# Patient Record
Sex: MALE | Race: WHITE | Employment: FULL TIME | URBAN - METROPOLITAN AREA
[De-identification: names, ages, dates, MRNs, and addresses within clinical notes are randomized per-mention and may not be internally consistent; named-entity substitution may affect disease eponyms.]

---

## 2017-03-14 ENCOUNTER — TRANSFERRED RECORDS (OUTPATIENT)
Dept: HEALTH INFORMATION MANAGEMENT | Facility: CLINIC | Age: 57
End: 2017-03-14

## 2017-03-21 ENCOUNTER — OFFICE VISIT (OUTPATIENT)
Dept: INTERNAL MEDICINE | Facility: CLINIC | Age: 57
End: 2017-03-21
Payer: COMMERCIAL

## 2017-03-21 ENCOUNTER — RADIANT APPOINTMENT (OUTPATIENT)
Dept: GENERAL RADIOLOGY | Facility: CLINIC | Age: 57
End: 2017-03-21
Attending: NURSE PRACTITIONER
Payer: COMMERCIAL

## 2017-03-21 VITALS
WEIGHT: 205 LBS | SYSTOLIC BLOOD PRESSURE: 99 MMHG | HEART RATE: 60 BPM | TEMPERATURE: 97.5 F | BODY MASS INDEX: 28.7 KG/M2 | OXYGEN SATURATION: 99 % | DIASTOLIC BLOOD PRESSURE: 57 MMHG | HEIGHT: 71 IN

## 2017-03-21 DIAGNOSIS — M95.4 RIB DEFORMITY: Primary | ICD-10-CM

## 2017-03-21 DIAGNOSIS — M95.4 RIB DEFORMITY: ICD-10-CM

## 2017-03-21 PROCEDURE — 71101 X-RAY EXAM UNILAT RIBS/CHEST: CPT | Mod: RT

## 2017-03-21 PROCEDURE — 99213 OFFICE O/P EST LOW 20 MIN: CPT | Performed by: NURSE PRACTITIONER

## 2017-03-21 ASSESSMENT — PAIN SCALES - GENERAL: PAINLEVEL: NO PAIN (0)

## 2017-03-21 NOTE — PROGRESS NOTES
SUBJECTIVE:                                                    Jon Yun is a 56 year old male who presents to clinic today for the following health issues:      Joint Pain     Onset: 1 day    Description:   Location: right side rib, lower rib is sticking out more then the other  Side, no pain.  Character: 0    Intensity: 0    Progression of Symptoms: same    Accompanying Signs & Symptoms:  Other symptoms: none   History:   Previous similar pain: no       Precipitating factors:   Trauma or overuse: no     Alleviating factors:  Improved by: nothing       Therapies Tried and outcome:     Patient concerned that right posterior rib is sticking out  Denies pain  Denies injury or trauma  Recently lost 30 pounds.   Just noticed it yesterday  Denies SOB, cough    Problem list and histories reviewed & adjusted, as indicated.  Additional history: none    Patient Active Problem List   Diagnosis     Gastritis, acute     External hemorrhoids     Cough     Chest pressure     Gastric ulcer     Hiatal hernia     CARDIOVASCULAR SCREENING; LDL GOAL LESS THAN 130     History of depression     Past Surgical History:   Procedure Laterality Date     C OPEN RX ANKLE DISLOCATN       COLONOSCOPY       ENDOSCOPY UPPER, COLONOSCOPY, COMBINED  10/23/2012    Procedure: COMBINED ENDOSCOPY UPPER, COLONOSCOPY;  EGD, COLONOSCOPY, GERD, HISTORY OF CANCER, polypectomy in colon, biopsies of antrum & esophagus;  Surgeon: Corbin Slaughter MD;  Location: [facility] OR     HC RECONSTR NOSE+SOLUEYMANE SEPTAL REPAIR         Social History   Substance Use Topics     Smoking status: Former Smoker     Years: 25.00     Types: Cigarettes     Quit date: 1/1/1998     Smokeless tobacco: Never Used     Alcohol use Yes      Comment: occ     Family History   Problem Relation Age of Onset     Thyroid Disease Mother      CANCER Father 74     Lung cancer     Cardiovascular Maternal Grandmother      Pacemaker age 67     CANCER Maternal Grandfather            Reviewed and  "updated as needed this visit by clinical staff  Tobacco  Allergies  Meds  Med Hx  Surg Hx  Fam Hx  Soc Hx      Reviewed and updated as needed this visit by Provider         ROS:  Noncontributory except as above    OBJECTIVE:                                                    BP 99/57 (BP Location: Right arm, Patient Position: Chair, Cuff Size: Adult Regular)  Pulse 60  Temp 97.5  F (36.4  C) (Oral)  Ht 5' 11\" (1.803 m)  Wt 205 lb (93 kg)  SpO2 99%  BMI 28.59 kg/m2  Body mass index is 28.59 kg/(m^2).  GENERAL: healthy, alert and no distress  RESP: lungs clear to auscultation - no rales, rhonchi or wheezes  CV: regular rate and rhythm, normal S1 S2, no S3 or S4, no murmur, click or rub, no peripheral edema and peripheral pulses strong  MS: Right posterior rib (#9) slightly protruding. Asymmetric with left. Non tender to palpation. No crepitus. No masses.   SKIN: no suspicious lesions or rashes  NEURO: Normal strength and tone, mentation intact and speech normal  ABD: Soft, non tender to palpation, no rebound, no guarding, no hepatomegaly    Diagnostic Test Results:  Xray: no osseus abnormalities     ASSESSMENT/PLAN:                                                        ICD-10-CM    1. Rib deformity M95.4 XR Ribs & Chest Right G/E 3 Views       Xray normal. Could be slight asymetry which he only now noticed after significant weight loss. Nontender. Asymptomatic. Recommend watchful waiting. If changing in size or character, return to clinic     BAYRON Esteves [facility]    "

## 2017-03-21 NOTE — NURSING NOTE
"Chief Complaint   Patient presents with     Musculoskeletal Problem     Rib pain       Initial BP 99/57 (BP Location: Right arm, Patient Position: Chair, Cuff Size: Adult Regular)  Pulse 60  Temp 97.5  F (36.4  C) (Oral)  Ht 5' 11\" (1.803 m)  Wt 205 lb (93 kg)  SpO2 99%  BMI 28.59 kg/m2 Estimated body mass index is 28.59 kg/(m^2) as calculated from the following:    Height as of this encounter: 5' 11\" (1.803 m).    Weight as of this encounter: 205 lb (93 kg).  Medication Reconciliation: complete   RUBEN Rico MA      "

## 2017-03-21 NOTE — MR AVS SNAPSHOT
"              After Visit Summary   3/21/2017    Jon Yun    MRN: [id number]           Patient Information     Date Of Birth          1960        Visit Information        Provider Department      3/21/2017 5:40 PM Stephanie Johnson APRN CNP [facility]        Today's Diagnoses     Rib deformity    -  1       Follow-ups after your visit        Follow-up notes from your care team     Return if symptoms worsen or fail to improve.      Who to contact     If you have questions or need follow up information about today's clinic visit or your schedule please contact [facility] directly at [phone].  Normal or non-critical lab and imaging results will be communicated to you by [employer], letter or phone within 4 business days after the clinic has received the results. If you do not hear from us within 7 days, please contact the clinic through [employer] or phone. If you have a critical or abnormal lab result, we will notify you by phone as soon as possible.  Submit refill requests through [facility] or call your pharmacy and they will forward the refill request to us. Please allow 3 business days for your refill to be completed.          Additional Information About Your Visit        MyChart Information     [facility] gives you secure access to your electronic health record. If you see a primary care provider, you can also send messages to your care team and make appointments. If you have questions, please call your primary care clinic.  If you do not have a primary care provider, please call [phone] and they will assist you.        Care EveryWhere ID     This is your Care EveryWhere ID. This could be used by other organizations to access your [facility] medical records  [id number]        Your Vitals Were     Pulse Temperature Height Pulse Oximetry BMI (Body Mass Index)       60 97.5  F (36.4  C) (Oral) 5' 11\" (1.803 m) 99% 28.59 kg/m2        Blood Pressure from " Last 3 Encounters:   03/21/17 99/57   10/19/16 114/78   10/17/16 130/74    Weight from Last 3 Encounters:   03/21/17 205 lb (93 kg)   10/19/16 272 lb (123.4 kg)   10/17/16 266 lb (120.7 kg)               Primary Care Provider Office Phone # Fax #    Bacilio Mckoy [phone]       [facility] [address]  [facility] [address]        Thank you!     Thank you for choosing [facility]  for your care. Our goal is always to provide you with excellent care. Hearing back from our patients is one way we can continue to improve our services. Please take a few minutes to complete the written survey that you may receive in the mail after your visit with us. Thank you!             Your Updated Medication List - Protect others around you: Learn how to safely use, store and throw away your medicines at www.disposemymeds.org.          This list is accurate as of: 3/21/17 11:59 PM.  Always use your most recent med list.                   Brand Name Dispense Instructions for use    omeprazole 20 MG CR capsule    priLOSEC    90 capsule    Take 1 capsule (20 mg) by mouth daily

## 2017-03-23 NOTE — PROGRESS NOTES
Jon,  I've released the read of your chest xray to [facility].  The radiologist did not see any abnormalities to your ribs.  It could be a subtle asymmetry that you only now notice due to the weight loss. If it worsens, or you continue to be concerned about it, I recommend you follow up with your primary in 1 month.  Stephanie Johnson, CNP

## 2017-05-22 ENCOUNTER — OFFICE VISIT (OUTPATIENT)
Dept: PEDIATRICS | Facility: CLINIC | Age: 57
End: 2017-05-22
Payer: COMMERCIAL

## 2017-05-22 VITALS
HEART RATE: 68 BPM | OXYGEN SATURATION: 98 % | WEIGHT: 201 LBS | BODY MASS INDEX: 28.03 KG/M2 | TEMPERATURE: 98.3 F | DIASTOLIC BLOOD PRESSURE: 59 MMHG | SYSTOLIC BLOOD PRESSURE: 97 MMHG

## 2017-05-22 DIAGNOSIS — R07.89 CHEST PRESSURE: Primary | ICD-10-CM

## 2017-05-22 PROCEDURE — 99396 PREV VISIT EST AGE 40-64: CPT | Performed by: INTERNAL MEDICINE

## 2017-05-22 PROCEDURE — 93000 ELECTROCARDIOGRAM COMPLETE: CPT | Performed by: INTERNAL MEDICINE

## 2017-05-22 ASSESSMENT — PAIN SCALES - GENERAL: PAINLEVEL: NO PAIN (0)

## 2017-05-22 NOTE — MR AVS SNAPSHOT
After Visit Summary   5/22/2017    Jon Yun    MRN: [id number]           Patient Information     Date Of Birth          1960        Visit Information        Provider Department      5/22/2017 12:20 PM Willem Bernal MD [facility]        Today's Diagnoses     Chest pressure    -  1      Care Instructions    Please call [facility] to schedule your stress echo. The number to call is [phone].        Follow-ups after your visit        Future tests that were ordered for you today     Open Future Orders        Priority Expected Expires Ordered    NM Lexiscan stress test Routine  5/22/2018 5/22/2017            Who to contact     If you have questions or need follow up information about today's clinic visit or your schedule please contact [facility] directly at [phone].  Normal or non-critical lab and imaging results will be communicated to you by MyChart, letter or phone within 4 business days after the clinic has received the results. If you do not hear from us within 7 days, please contact the clinic through MyChart or phone. If you have a critical or abnormal lab result, we will notify you by phone as soon as possible.  Submit refill requests through [employer] or call your pharmacy and they will forward the refill request to us. Please allow 3 business days for your refill to be completed.          Additional Information About Your Visit        MyChart Information     [employer] gives you secure access to your electronic health record. If you see a primary care provider, you can also send messages to your care team and make appointments. If you have questions, please call your primary care clinic.  If you do not have a primary care provider, please call [phone] and they will assist you.        Care EveryWhere ID     This is your Care EveryWhere ID. This could be used by other organizations to access your [facility]  records  [id number]        Your Vitals Were     Pulse Temperature Pulse Oximetry BMI (Body Mass Index)          68 98.3  F (36.8  C) (Oral) 98% 28.03 kg/m2         Blood Pressure from Last 3 Encounters:   05/22/17 97/59   03/21/17 99/57   10/19/16 114/78    Weight from Last 3 Encounters:   05/22/17 201 lb (91.2 kg)   03/21/17 205 lb (93 kg)   10/19/16 272 lb (123.4 kg)              We Performed the Following     DEPRESSION ACTION PLAN (DAP)     EKG 12-lead complete w/read - Clinics        Primary Care Provider Office Phone # Fax #    Bacilio Mckoy [phone]       [facility] [address] [facility] [address]        Thank you!     Thank you for choosing [facility]  for your care. Our goal is always to provide you with excellent care. Hearing back from our patients is one way we can continue to improve our services. Please take a few minutes to complete the written survey that you may receive in the mail after your visit with us. Thank you!             Your Updated Medication List - Protect others around you: Learn how to safely use, store and throw away your medicines at www.disposemymeds.org.          This list is accurate as of: 5/22/17  1:19 PM.  Always use your most recent med list.                   Brand Name Dispense Instructions for use    omeprazole 20 MG CR capsule    priLOSEC    90 capsule    Take 1 capsule (20 mg) by mouth daily

## 2017-05-22 NOTE — PROGRESS NOTES
SUBJECTIVE:                                                    Jon Yun is a 57 year old male who presents to clinic today for the following health issues:    Back Pain      Duration: 1-2 weeks        Specific cause: none    Description:   Location of pain: middle of back right  Character of pain: dull ache  Pain radiation: in the chest/ribs  New numbness or weakness in legs, not attributed to pain:  no     Intensity: Currently 0/10    History:   Pain interferes with job: No  History of back problems: no prior back problems  Any previous MRI or X-rays: None  Sees a specialist for back pain:  No  Therapies tried without relief: Nothing    Alleviating factors:   Improved by: Nothing      Precipitating factors:  Worsened by: Lying Flat    Functional and Psychosocial Screen (Christian STarT Back):      Not performed today    Zuleika Carcamo MA     Accompanying Signs & Symptoms:  Risk of Fracture:  None  Risk of Cauda Equina:  None  Risk of Infection:  None  Risk of Cancer:  None  Risk of Ankylosing Spondylitis:  Onset at age <35, male, AND morning back stiffness. no                  Pectus excavatum  Rib and felt and   Pain in the thoracic spine   No chest pain on excerise   No fever.   No cough   Mild shortness of breath   Pressure hard to breath in           Problem list and histories reviewed & adjusted, as indicated.  Additional history: as documented    Patient Active Problem List   Diagnosis     Gastritis, acute     External hemorrhoids     Cough     Chest pressure     Gastric ulcer     Hiatal hernia     CARDIOVASCULAR SCREENING; LDL GOAL LESS THAN 130     History of depression     Past Surgical History:   Procedure Laterality Date     C OPEN RX ANKLE DISLOCATN       COLONOSCOPY       ENDOSCOPY UPPER, COLONOSCOPY, COMBINED  10/23/2012    Procedure: COMBINED ENDOSCOPY UPPER, COLONOSCOPY;  EGD, COLONOSCOPY, GERD, HISTORY OF CANCER, polypectomy in colon, biopsies of antrum & esophagus;  Surgeon: Kasandra  Corbin Parry MD;  Location: [facility] OR     HC RECONSTR NOSE+SOULEYMANE SEPTAL REPAIR         Social History   Substance Use Topics     Smoking status: Former Smoker     Years: 25.00     Types: Cigarettes     Quit date: 1/1/1998     Smokeless tobacco: Never Used     Alcohol use Yes      Comment: occ     Family History   Problem Relation Age of Onset     Thyroid Disease Mother      CANCER Father 74     Lung cancer     Cardiovascular Maternal Grandmother      Pacemaker age 67     CANCER Maternal Grandfather          Current Outpatient Prescriptions   Medication Sig Dispense Refill     omeprazole (PRILOSEC) 20 MG capsule Take 1 capsule (20 mg) by mouth daily 90 capsule 3     No Known Allergies    Reviewed and updated as needed this visit by clinical staff  Tobacco  Allergies  Meds  Med Hx  Surg Hx  Fam Hx  Soc Hx      Reviewed and updated as needed this visit by Provider         ROS:  Constitutional, HEENT, cardiovascular, pulmonary, gi and gu systems are negative, except as otherwise noted.    OBJECTIVE:                                                    BP 97/59 (BP Location: Right arm, Patient Position: Chair, Cuff Size: Adult Regular)  Pulse 68  Temp 98.3  F (36.8  C) (Oral)  Wt 201 lb (91.2 kg)  SpO2 98%  BMI 28.03 kg/m2  Body mass index is 28.03 kg/(m^2).  GENERAL: healthy, alert and no distress  NECK: no adenopathy, no asymmetry, masses, or scars and thyroid normal to palpation  RESP: lungs clear to auscultation - no rales, rhonchi or wheezes  CV: regular rate and rhythm, normal S1 S2, no S3 or S4, no murmur, click or rub, no peripheral edema and peripheral pulses strong  ABDOMEN: soft, nontender, no hepatosplenomegaly, no masses and bowel sounds normal  MS: no gross musculoskeletal defects noted, no edema    Diagnostic Test Results:  Results for orders placed or performed in visit on 03/21/17   XR Ribs & Chest Right G/E 3 Views    Narrative    RIBS AND CHEST RIGHT THREE VIEWS   3/21/2017 6:34 PM     HISTORY:  Deformity of anterior lower ribs.    COMPARISON: Chest x-ray 1/8/2016.    FINDINGS: Heart size is normal. Lungs are clear. There is a  moderate-sized hiatal hernia behind the heart. Rib views are negative.  No discernible interval change.      Impression    IMPRESSION: Hiatal hernia. Nothing acute and no interval change. The  right rib views are negative.    ARMANDO FOWLER MD        ASSESSMENT/PLAN:                                                        ICD-10-CM    1. Chest pressure R07.89 EKG 12-lead complete w/read - Clinics     NM Lexiscan stress test     Maybe due to large hiatal hernia.  However, with age and risk factors, will try to complete stress evaluation    Use ppi regularly until that time           Willem Bernal MD  [facility]

## 2017-05-22 NOTE — NURSING NOTE
"Chief Complaint   Patient presents with     Back Pain     Rib Pain       Initial BP 97/59 (BP Location: Right arm, Patient Position: Chair, Cuff Size: Adult Regular)  Pulse 68  Temp 98.3  F (36.8  C) (Oral)  Wt 201 lb (91.2 kg)  SpO2 98%  BMI 28.03 kg/m2 Estimated body mass index is 28.03 kg/(m^2) as calculated from the following:    Height as of 3/21/17: 5' 11\" (1.803 m).    Weight as of this encounter: 201 lb (91.2 kg).  Medication Reconciliation: complete   Zuleika Carcamo MA      "

## 2019-02-26 ENCOUNTER — OFFICE VISIT (OUTPATIENT)
Dept: PODIATRY | Facility: CLINIC | Age: 59
End: 2019-02-26
Payer: COMMERCIAL

## 2019-02-26 VITALS
WEIGHT: 205 LBS | BODY MASS INDEX: 28.59 KG/M2 | HEART RATE: 90 BPM | SYSTOLIC BLOOD PRESSURE: 108 MMHG | DIASTOLIC BLOOD PRESSURE: 70 MMHG

## 2019-02-26 DIAGNOSIS — B35.1 ONYCHOMYCOSIS: Primary | ICD-10-CM

## 2019-02-26 DIAGNOSIS — L84 CALLUS: ICD-10-CM

## 2019-02-26 PROCEDURE — 99213 OFFICE O/P EST LOW 20 MIN: CPT | Mod: 25 | Performed by: PODIATRIST

## 2019-02-26 PROCEDURE — 11056 PARNG/CUTG B9 HYPRKR LES 2-4: CPT | Performed by: PODIATRIST

## 2019-02-26 RX ORDER — CICLOPIROX 80 MG/ML
1 SOLUTION TOPICAL DAILY
Qty: 6.6 ML | Refills: 3 | Status: SHIPPED | OUTPATIENT
Start: 2019-02-26 | End: 2020-02-26

## 2019-02-26 NOTE — PATIENT INSTRUCTIONS
We wish you continued good healing. If you have any questions or concerns, please do not hesitate to contact us at [phone]    Please remember to call and schedule a follow up appointment if one was recommended at your earliest convenience.   PODIATRY CLINIC HOURS  TELEPHONE NUMBER    Dr. Willem Lozoya D.P.M [facility]    Clinics:  [facility]    [facility]ril [facility]   Tuesday 1PM-6PM  [place]  Wednesday 7AM-2PM  [facility]  Thursday 10AM-6PM  [place]  Friday 7AM-3PM  [place]  Specialty schedulers:   [phone] to make an appointment with any Specialty Provider.        Urgent Care locations:    [facility] Monday-Friday 5 pm - 9 pm. Saturday-Sunday 9 am -5pm    Monday-Friday 11 am - 9 pm Saturday 9 am - 5 pm     Monday-Sunday 12 noon-8PM [phone]     If you need a medication refill, please contact us you may need lab work and/or a follow up visit prior to your refill (i.e. Antifungal medications).    [employer] (secure e-mail communication and access to your chart) to send a message or to make an appointment.    If MRI needed please call [place] at [phone]        Weight management plan: Patient was referred to their PCP to discuss a diet and exercise plan.

## 2019-02-26 NOTE — LETTER
2/26/2019         RE: Jon Yun  [address] [address]        Dear Colleague,    Thank you for referring your patient, Jon Yun, to the [facility]. Please see a copy of my visit note below.    Patient complains of thick left first toenails(s) .  Has had this for several years.  It is slowly getting worse.  Has had pain in the past which is aggravated by activity and relieved by rest.   Does not like the look of the nail and is wondering about options.  Patient also has left sub second thick skin.  Occasionally painful at times.  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  Denies numbness.         ROS:  A 10-point review of systems was performed and is positive for that noted in the HPI and as seen below.  All other areas are negative.        No Known Allergies    Current Outpatient Medications   Medication Sig Dispense Refill     ciclopirox (PENLAC) 8 % external solution Apply 1 applicator topically daily 6.6 mL 3     omeprazole (PRILOSEC) 20 MG capsule Take 1 capsule (20 mg) by mouth daily 90 capsule 3       Patient Active Problem List   Diagnosis     Gastritis, acute     External hemorrhoids     Cough     Chest pressure     Gastric ulcer     Hiatal hernia     CARDIOVASCULAR SCREENING; LDL GOAL LESS THAN 130     History of depression       Past Medical History:   Diagnosis Date     Chest pressure      Cough 8/13/2009     External hemorrhoids 8/13/2009     Gastritis; acute 8/13/2009     GERD (gastroesophageal reflux disease) 8/13/2009     Hiatal hernia 10/2009     Mild major depression (H) 8/13/2009       Past Surgical History:   Procedure Laterality Date     C OPEN RX ANKLE DISLOCATN       COLONOSCOPY       ENDOSCOPY UPPER, COLONOSCOPY, COMBINED  10/23/2012    Procedure: COMBINED ENDOSCOPY UPPER, COLONOSCOPY;  EGD, COLONOSCOPY, GERD, HISTORY OF CANCER, polypectomy in colon, biopsies of antrum & esophagus;  Surgeon: Corbin Slaughter MD;  Location:  OR      RECONSTR  NOSE+SOULEYMANE SEPTAL REPAIR         Family History   Problem Relation Age of Onset     Thyroid Disease Mother      Cancer Father 74        Lung cancer     Cardiovascular Maternal Grandmother         Pacemaker age 67     Cancer Maternal Grandfather        Social History     Tobacco Use     Smoking status: Former Smoker     Years: 25.00     Types: Cigarettes     Last attempt to quit: 1998     Years since quittin.1     Smokeless tobacco: Never Used   Substance Use Topics     Alcohol use: Yes     Comment: occ         /70   Pulse 90   Wt 93 kg (205 lb)   BMI 28.59 kg/m   .      VConstitutional/ general:  Pt is in no apparent distress, appears well-nourished.  Cooperative with history and physical exam. Seen with wife.     Psych:  The patient answered questions appropriately.  Normal affect.  Seems to have reasonable expectations, in terms of treatment.    Eyes:  Visual scanning/ tracking without deficit.    Ears:  Response to auditory stimuli is normal.  negative hearing aid devices.  Auricles in proper alignment.     Lymphatic:  Popliteal lymph nodes not enlarged.     Lungs:  Non labored breathing, non labored speech. No cough.  No audible wheezing. Even, quiet breathing.       Vascular:  Pedal pulses are palpable bilaterally for both the DP and PT arteries.  CFT < 3 sec.  No edema.  Pedal hair growth noted.     Neuro:  Alert and oriented x 3. Coordinated gait.  Light touch sensation is intact to the L4, L5, S1 distributions. No obvious deficits.  No evidence of neurological-based weakness, spasticity, or contracture in the lower extremities.    Derm: Normal texture and turgor.  No erythema, ecchymosis, or cyanosis.  No open lesions. left first toenails(s) lateral thickened, elongated, discolored, with subungual debris.  No erythema, edema, drainage, pain on palpation.  No signs of subungual masses or exostosis.  Calluses x 2 left sub second met head.      Musculoskeletal:    Lower extremity muscle strength  is normal.  Patient is ambulatory without an assistive device or brace.  No gross deformities.  MS 5/5 all compartments.  Normal arch with weightbearing.         A/P  Onychomycosis          Calluses x 2    Discussed all options with patient for onychomycosis.  Risks, complications, and efficacy of topicals were discussed with the patient.  Will start penlac and instructed him on how to use this.  Calluses debrided with a fifteen blade.  Will keep down with pumice stone.  rtc prn.      Willem Lozoya DPM, FACFAS                  Again, thank you for allowing me to participate in the care of your patient.        Sincerely,        Willem Lozoya DPM

## 2019-02-27 NOTE — PROGRESS NOTES
Patient complains of thick left first toenails(s) .  Has had this for several years.  It is slowly getting worse.  Has had pain in the past which is aggravated by activity and relieved by rest.   Does not like the look of the nail and is wondering about options.  Patient also has left sub second thick skin.  Occasionally painful at times.  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  Denies numbness.         ROS:  A 10-point review of systems was performed and is positive for that noted in the HPI and as seen below.  All other areas are negative.        No Known Allergies    Current Outpatient Medications   Medication Sig Dispense Refill     ciclopirox (PENLAC) 8 % external solution Apply 1 applicator topically daily 6.6 mL 3     omeprazole (PRILOSEC) 20 MG capsule Take 1 capsule (20 mg) by mouth daily 90 capsule 3       Patient Active Problem List   Diagnosis     Gastritis, acute     External hemorrhoids     Cough     Chest pressure     Gastric ulcer     Hiatal hernia     CARDIOVASCULAR SCREENING; LDL GOAL LESS THAN 130     History of depression       Past Medical History:   Diagnosis Date     Chest pressure      Cough 8/13/2009     External hemorrhoids 8/13/2009     Gastritis; acute 8/13/2009     GERD (gastroesophageal reflux disease) 8/13/2009     Hiatal hernia 10/2009     Mild major depression (H) 8/13/2009       Past Surgical History:   Procedure Laterality Date     C OPEN RX ANKLE DISLOCATN       COLONOSCOPY       ENDOSCOPY UPPER, COLONOSCOPY, COMBINED  10/23/2012    Procedure: COMBINED ENDOSCOPY UPPER, COLONOSCOPY;  EGD, COLONOSCOPY, GERD, HISTORY OF CANCER, polypectomy in colon, biopsies of antrum & esophagus;  Surgeon: Corbin Slaughter MD;  Location: [facility]     HC RECONSTR NOSE+SOULEYMANE SEPTAL REPAIR         Family History   Problem Relation Age of Onset     Thyroid Disease Mother      Cancer Father 74        Lung cancer     Cardiovascular Maternal Grandmother         Pacemaker age 67     Cancer Maternal Grandfather         Social History     Tobacco Use     Smoking status: Former Smoker     Years: 25.00     Types: Cigarettes     Last attempt to quit: 1998     Years since quittin.1     Smokeless tobacco: Never Used   Substance Use Topics     Alcohol use: Yes     Comment: occ         /70   Pulse 90   Wt 93 kg (205 lb)   BMI 28.59 kg/m  .      VConstitutional/ general:  Pt is in no apparent distress, appears well-nourished.  Cooperative with history and physical exam. Seen with wife.     Psych:  The patient answered questions appropriately.  Normal affect.  Seems to have reasonable expectations, in terms of treatment.    Eyes:  Visual scanning/ tracking without deficit.    Ears:  Response to auditory stimuli is normal.  negative hearing aid devices.  Auricles in proper alignment.     Lymphatic:  Popliteal lymph nodes not enlarged.     Lungs:  Non labored breathing, non labored speech. No cough.  No audible wheezing. Even, quiet breathing.       Vascular:  Pedal pulses are palpable bilaterally for both the DP and PT arteries.  CFT < 3 sec.  No edema.  Pedal hair growth noted.     Neuro:  Alert and oriented x 3. Coordinated gait.  Light touch sensation is intact to the L4, L5, S1 distributions. No obvious deficits.  No evidence of neurological-based weakness, spasticity, or contracture in the lower extremities.    Derm: Normal texture and turgor.  No erythema, ecchymosis, or cyanosis.  No open lesions. left first toenails(s) lateral thickened, elongated, discolored, with subungual debris.  No erythema, edema, drainage, pain on palpation.  No signs of subungual masses or exostosis.  Calluses x 2 left sub second met head.      Musculoskeletal:    Lower extremity muscle strength is normal.  Patient is ambulatory without an assistive device or brace.  No gross deformities.  MS 5/5 all compartments.  Normal arch with weightbearing.         A/P  Onychomycosis          Calluses x 2    Discussed all options with patient for  onychomycosis.  Risks, complications, and efficacy of topicals were discussed with the patient.  Will start penlac and instructed him on how to use this.  Calluses debrided with a fifteen blade.  Will keep down with pumice stone.  rtc prn.      Willem Lozoya DPM, FACGAEL

## 2019-12-08 ENCOUNTER — HEALTH MAINTENANCE LETTER (OUTPATIENT)
Age: 59
End: 2019-12-08

## 2021-01-10 ENCOUNTER — HEALTH MAINTENANCE LETTER (OUTPATIENT)
Age: 61
End: 2021-01-10

## 2021-04-21 ENCOUNTER — OFFICE VISIT (OUTPATIENT)
Dept: URGENT CARE | Facility: URGENT CARE | Age: 61
End: 2021-04-21
Payer: COMMERCIAL

## 2021-04-21 ENCOUNTER — ANCILLARY PROCEDURE (OUTPATIENT)
Dept: GENERAL RADIOLOGY | Facility: CLINIC | Age: 61
End: 2021-04-21
Attending: PHYSICIAN ASSISTANT
Payer: COMMERCIAL

## 2021-04-21 VITALS
WEIGHT: 225 LBS | OXYGEN SATURATION: 97 % | TEMPERATURE: 97.7 F | SYSTOLIC BLOOD PRESSURE: 135 MMHG | RESPIRATION RATE: 20 BRPM | HEART RATE: 67 BPM | BODY MASS INDEX: 31.38 KG/M2 | DIASTOLIC BLOOD PRESSURE: 76 MMHG

## 2021-04-21 DIAGNOSIS — S51.012A ELBOW LACERATION, LEFT, INITIAL ENCOUNTER: ICD-10-CM

## 2021-04-21 DIAGNOSIS — S59.902A ELBOW INJURY, LEFT, INITIAL ENCOUNTER: Primary | ICD-10-CM

## 2021-04-21 PROCEDURE — 73070 X-RAY EXAM OF ELBOW: CPT | Mod: LT | Performed by: RADIOLOGY

## 2021-04-21 PROCEDURE — 99203 OFFICE O/P NEW LOW 30 MIN: CPT | Mod: 25 | Performed by: PHYSICIAN ASSISTANT

## 2021-04-21 PROCEDURE — 12001 RPR S/N/AX/GEN/TRNK 2.5CM/<: CPT | Performed by: PHYSICIAN ASSISTANT

## 2021-04-21 RX ORDER — IBUPROFEN 600 MG/1
600 TABLET, FILM COATED ORAL EVERY 6 HOURS PRN
Qty: 30 TABLET | Refills: 0 | Status: SHIPPED | OUTPATIENT
Start: 2021-04-21 | End: 2022-02-17

## 2021-04-21 ASSESSMENT — ENCOUNTER SYMPTOMS
SHORTNESS OF BREATH: 0
NECK PAIN: 0
MYALGIAS: 1
WHEEZING: 0
FEVER: 0
WOUND: 1
FATIGUE: 0
COUGH: 0
BACK PAIN: 0
COLOR CHANGE: 1
ARTHRALGIAS: 1
CHILLS: 0
NECK STIFFNESS: 0
CHEST TIGHTNESS: 0
JOINT SWELLING: 0
CONSTITUTIONAL NEGATIVE: 1
PALPITATIONS: 0

## 2021-04-21 NOTE — LETTER
[facility] [address] [address]    2021    Re: Jon Yun  [address] [address]  [phone] (home) [phone] (work)    : 1960      To Whom It May Concern:      Jon Judgewilda seen in clinic today and I would recommend no heavy lifting, pushing or pulling greater than 5lbs for the next 1week.  Please feel free to contact me via phone if you have any questions or concerns.        Sincerely,      Shonna See DU James

## 2021-04-21 NOTE — PROGRESS NOTES
Antonio Webb is a 61 year old who presents for the following health issues   HPI   Musculoskeletal problem/pain  Onset/Duration: today  Description  Location: elbow - left  Joint Swelling: YES  Redness: no  Pain: YES  Warmth: no  Intensity:  mild  Progression of Symptoms:  same  Accompanying signs and symptoms:   Fevers: no  Numbness/tingling/weakness: no  History  Trauma to the area: YES- sustained a L elbow injury and laceration after falling down stairs.  No head or neck injuries.  No LOC.  Recent illness:  no  Previous similar problem: no  Previous evaluation:  no  Precipitating or alleviating factors:  Aggravating factors include: pressure, palpation, movement  Therapies tried and outcome: rest/inactivity, immobilization and bandaids with some relief    Patient Active Problem List   Diagnosis     Gastritis, acute     External hemorrhoids     Cough     Chest pressure     Gastric ulcer     Hiatal hernia     CARDIOVASCULAR SCREENING; LDL GOAL LESS THAN 130     History of depression     Current Outpatient Medications   Medication     omeprazole (PRILOSEC) 20 MG capsule     No current facility-administered medications for this visit.       No Known Allergies    Review of Systems   Constitutional: Negative.  Negative for chills, fatigue and fever.   Respiratory: Negative for cough, chest tightness, shortness of breath and wheezing.    Cardiovascular: Negative for chest pain, palpitations and peripheral edema.   Musculoskeletal: Positive for arthralgias and myalgias. Negative for back pain, gait problem, joint swelling, neck pain and neck stiffness.   Skin: Positive for color change and wound. Negative for pallor and rash.   All other systems reviewed and are negative.           Objective    /76 (BP Location: Left arm, Patient Position: Sitting, Cuff Size: Adult Large)   Pulse 67   Temp 97.7  F (36.5  C) (Tympanic)   Resp 20   Wt 102.1 kg (225 lb)   SpO2 97%   BMI 31.38 kg/m    Body mass index is  31.38 kg/m .  Physical Exam  Vitals signs and nursing note reviewed.   Constitutional:       General: He is not in acute distress.     Appearance: Normal appearance. He is well-developed and normal weight. He is not ill-appearing.   Musculoskeletal:      Right elbow: Normal.He exhibits normal range of motion, no swelling, no deformity and no laceration. No tenderness found.      Left elbow: He exhibits decreased range of motion, swelling and laceration (1cm horizontal laceration present ). He exhibits no effusion and no deformity. Tenderness found. No radial head, no medial epicondyle, no lateral epicondyle and no olecranon process tenderness noted.   Skin:     General: Skin is warm and dry.      Capillary Refill: Capillary refill takes less than 2 seconds.      Findings: Abrasion (present over his lower legs bilaterally) present.      Comments: Distal pulses are 2+ and symmetric.  No peripheral edema.   Neurological:      Mental Status: He is alert and oriented to person, place, and time.      Sensory: Sensation is intact. No sensory deficit.      Motor: Motor function is intact.      Deep Tendon Reflexes: Reflexes are normal and symmetric.   Psychiatric:         Mood and Affect: Mood normal.         Behavior: Behavior normal.         Thought Content: Thought content normal.         Judgment: Judgment normal.        Results for orders placed or performed in visit on 04/21/21 (from the past 24 hour(s))   XR Elbow Left 2 Views    Narrative    XR ELBOW LT 2 VW 4/21/2021 3:25 PM     HISTORY: Elbow injury, left, initial encounter      Impression    IMPRESSION: Olecranon spur at the triceps tendon attachment. No  evidence of acute fracture. Joint space width and alignment appear  within normal limits.    REZA BARCENAS MD     Procedure:  Discussed risks and benefits of procedure and patient had decided to proceed. Laceration was irrigated with normal saline and then sterilized with betadine swabs X3.  This was then  anesthetized with 3cc of 2% lidocaine with epi.  Laceration was repaired with two 4-0 ethilon sutures in a simple interrupted fashion and dressed with bacitracin, gauze and coban.  Minimal bleeding.  Patient tolerated procedure well.        Assessment/Plan:  Elbow injury, left, initial encounter:  Xrays were negative for fractures.  No evidence of infection at this time.  He is due for his Td but has declined this at this time due to his recent covid vaccine.  Will get his Td after completion of his covid vaccine.  This was closed with sutures and placed in dressings.  Keep clean and dry for the next 24hours.  Tylenol/ibuprofen as needed for pain.  RTC in 7-10days for suture removal.  RTC sooner if he develops worsening pain, numbness, redness, drainage or fevers.   -     XR Elbow Left 2 Views  -     ibuprofen (ADVIL/MOTRIN) 600 MG tablet; Take 1 tablet (600 mg) by mouth every 6 hours as needed for moderate pain    Elbow laceration, left, initial encounter  -     REPAIR SUPERFICIAL, WOUND BODY < =2.5CM            Shonna See DU James

## 2021-04-27 ENCOUNTER — OFFICE VISIT (OUTPATIENT)
Dept: FAMILY MEDICINE | Facility: CLINIC | Age: 61
End: 2021-04-27
Payer: COMMERCIAL

## 2021-04-27 ENCOUNTER — MYC MEDICAL ADVICE (OUTPATIENT)
Dept: FAMILY MEDICINE | Facility: CLINIC | Age: 61
End: 2021-04-27

## 2021-04-27 ENCOUNTER — ANCILLARY PROCEDURE (OUTPATIENT)
Dept: GENERAL RADIOLOGY | Facility: CLINIC | Age: 61
End: 2021-04-27
Attending: FAMILY MEDICINE
Payer: COMMERCIAL

## 2021-04-27 VITALS
HEIGHT: 71 IN | BODY MASS INDEX: 32.36 KG/M2 | SYSTOLIC BLOOD PRESSURE: 123 MMHG | WEIGHT: 231.13 LBS | DIASTOLIC BLOOD PRESSURE: 56 MMHG | OXYGEN SATURATION: 98 % | TEMPERATURE: 98.2 F | HEART RATE: 60 BPM

## 2021-04-27 DIAGNOSIS — R05.9 COUGH: Primary | ICD-10-CM

## 2021-04-27 DIAGNOSIS — K21.00 GASTROESOPHAGEAL REFLUX DISEASE WITH ESOPHAGITIS, UNSPECIFIED WHETHER HEMORRHAGE: ICD-10-CM

## 2021-04-27 DIAGNOSIS — Z12.2 ENCOUNTER FOR SCREENING FOR LUNG CANCER: Primary | ICD-10-CM

## 2021-04-27 DIAGNOSIS — Z87.891 HISTORY OF TOBACCO ABUSE: ICD-10-CM

## 2021-04-27 DIAGNOSIS — R93.89 ABNORMAL CXR: ICD-10-CM

## 2021-04-27 DIAGNOSIS — J31.0 CHRONIC RHINITIS: ICD-10-CM

## 2021-04-27 PROCEDURE — 71046 X-RAY EXAM CHEST 2 VIEWS: CPT | Performed by: RADIOLOGY

## 2021-04-27 PROCEDURE — 99214 OFFICE O/P EST MOD 30 MIN: CPT | Performed by: FAMILY MEDICINE

## 2021-04-27 RX ORDER — FLUTICASONE PROPIONATE 50 MCG
SPRAY, SUSPENSION (ML) NASAL
Qty: 16 G | Refills: 11 | Status: SHIPPED | OUTPATIENT
Start: 2021-04-27

## 2021-04-27 ASSESSMENT — MIFFLIN-ST. JEOR: SCORE: 1885.51

## 2021-04-27 NOTE — PROGRESS NOTES
"         Antonio Webb is a 61 year old who presents for the following health issues     HPI     Cough for the past couple months    Review of Systems      Heavy smoker in past    Quit about 15 years ago    Smoked for about 20  To 25 years    Not much phlegm; just occasionally     No colored phlegm  No blood    No chest pain    Feels like maybe something  In chest     No fever/ chills    Taste and smell okay    No history of allergies    No itchy or watery eyes    No sneezing or runny nose  Not much sore throat     No acid taste; had in past    Cough is not positional    Worse  Later  In day     Not real short of breath    Lower energy    No orthopnea    Lifting  wts  Recently    Walking a lot at work    Postal delivery walking  Route    Nose often plugged up     Mouth breather            Objective    /56 (BP Location: Right arm, Patient Position: Chair, Cuff Size: Adult Large)   Pulse 60   Temp 98.2  F (36.8  C) (Oral)   Ht 1.803 m (5' 11\")   Wt 104.8 kg (231 lb 2 oz)   SpO2 98%   BMI 32.24 kg/m    Body mass index is 32.24 kg/m .  Physical Exam  Constitutional:       Appearance: He is well-developed.   HENT:      Head: Normocephalic and atraumatic.      Nose: Nose normal.      Mouth/Throat:      Mouth: Mucous membranes are moist.      Pharynx: Oropharynx is clear.   Eyes:      Conjunctiva/sclera: Conjunctivae normal.   Neck:      Vascular: No carotid bruit.   Cardiovascular:      Rate and Rhythm: Normal rate and regular rhythm.      Heart sounds: Normal heart sounds.   Pulmonary:      Effort: Pulmonary effort is normal. No respiratory distress.      Breath sounds: Normal breath sounds.   Chest:      Chest wall: No tenderness.   Abdominal:      Palpations: Abdomen is soft.      Tenderness: There is no abdominal tenderness.   Neurological:      Mental Status: He is alert and oriented to person, place, and time.      Cranial Nerves: No cranial nerve deficit.   Psychiatric:         Speech: Speech " normal.         Behavior: Behavior normal.         tympanic membranes partially seen, wax present  No sinus/ submandib tenderness           ASSESSMENT / PLAN:  (R05) Cough  (primary encounter diagnosis)  Comment: discussed in detail with patient.  cxr done here, abnormal lung base on right next to heart.  No obvious chf.  He does have family history of lung cancer and past history of smoking.  Check CT.  Patient to call and schedule.   Plan: XR Chest 2 Views, CT Chest w/o Contrast             (J31.0) Chronic rhinitis  Comment: trial of flonase  Plan: fluticasone (FLONASE) 50 MCG/ACT nasal spray             (R93.89) Abnormal CXR  Comment: as above   Plan: CT Chest w/o Contrast             (Z87.891) History of tobacco abuse  Comment: discussed  Plan: as above    Gerd:  Advised trial of h2 blocker      I reviewed the patient's medications, allergies, medical history, family history, and social history.    Bacilio Mckoy MD

## 2021-04-27 NOTE — LETTER
[facility]  [address]  [place] [address]  [phone]              2021    To whom it may concern:    Jon Judgewilda cage 64 was seen  Here in clinic today.   Due to current symptoms and medical testing, advise he be excused from overtime for the next two months.              Sincerely,                Bacilio Mckoy MD

## 2021-04-27 NOTE — PATIENT INSTRUCTIONS
Use flonase nasal spray daily for 2-3 weeks as therapeutic trial    Could try an over the counter acid reducer like pepcid ( famotidine )     Call and schedule CT scan

## 2021-04-29 ENCOUNTER — OFFICE VISIT (OUTPATIENT)
Dept: FAMILY MEDICINE | Facility: CLINIC | Age: 61
End: 2021-04-29
Payer: COMMERCIAL

## 2021-04-29 ENCOUNTER — ANCILLARY PROCEDURE (OUTPATIENT)
Dept: GENERAL RADIOLOGY | Facility: CLINIC | Age: 61
End: 2021-04-29
Attending: FAMILY MEDICINE
Payer: COMMERCIAL

## 2021-04-29 VITALS
HEART RATE: 78 BPM | TEMPERATURE: 97.9 F | OXYGEN SATURATION: 97 % | WEIGHT: 229 LBS | BODY MASS INDEX: 31.94 KG/M2 | SYSTOLIC BLOOD PRESSURE: 126 MMHG | DIASTOLIC BLOOD PRESSURE: 67 MMHG

## 2021-04-29 DIAGNOSIS — M25.562 ACUTE PAIN OF LEFT KNEE: Primary | ICD-10-CM

## 2021-04-29 DIAGNOSIS — S51.012D ELBOW LACERATION, LEFT, SUBSEQUENT ENCOUNTER: ICD-10-CM

## 2021-04-29 PROCEDURE — 99214 OFFICE O/P EST MOD 30 MIN: CPT | Performed by: FAMILY MEDICINE

## 2021-04-29 PROCEDURE — 73562 X-RAY EXAM OF KNEE 3: CPT | Mod: LT | Performed by: RADIOLOGY

## 2021-04-29 ASSESSMENT — PAIN SCALES - GENERAL: PAINLEVEL: NO PAIN (0)

## 2021-04-29 NOTE — PROGRESS NOTES
Antonio Webb is a 61 year old who presents for the following health issues:    HPI     Musculoskeletal problem/pain  Onset/Duration: 4/21/2021  Description  Location: knee - left  Joint Swelling: YES- little  Redness: no  Pain: YES- hurts to kneel on it now.   Warmth: no  Intensity:  moderate  Progression of Symptoms:  same  Accompanying signs and symptoms:   Fevers: no  Numbness/tingling/weakness: YES- tingling  History  Trauma to the area: YES- fell downstairs on 4/21/21  Recent illness:  no  Previous similar problem: no  Previous evaluation:  no  Precipitating or alleviating factors:  Aggravating factors include: kneeling on left knee  Therapies tried and outcome: nothing    -look at stiches in left elbow.     Review of Systems   Constitutional, HEENT, cardiovascular, pulmonary, GI, , musculoskeletal, neuro, skin, endocrine and psych systems are negative, except as otherwise noted.      Objective    /67 (BP Location: Left arm, Patient Position: Sitting, Cuff Size: Adult Regular)   Pulse 78   Temp 97.9  F (36.6  C) (Tympanic)   Wt 103.9 kg (229 lb)   SpO2 97%   BMI 31.94 kg/m    Body mass index is 31.94 kg/m .  Physical Exam   GENERAL: healthy, alert and no distress  NECK: no adenopathy, no asymmetry, masses, or scars and thyroid normal to palpation  RESP: lungs clear to auscultation - no rales, rhonchi or wheezes  CV: regular rate and rhythm, normal S1 S2, no S3 or S4, no murmur, click or rub, no peripheral edema and peripheral pulses strong  ABDOMEN: soft, nontender, no hepatosplenomegaly, no masses and bowel sounds normal  MS: mild tenderness left prepatellar area, no redness, no swelling  SKIN: left elbow laceration well approximated, no surrounding erythema    A/P:  (M25.562) Acute pain of left knee  (primary encounter diagnosis)  Comment:   Plan: XR Knee Left 3 Views        Likely mild bursitis. May ice and ibuprofen as needed. Will r/o patellar fracture with xray.    (Y43.349W)  Elbow laceration, left, subsequent encounter  Comment:   Plan: two sutures removed today without complications.    Wyatt Vang MD

## 2021-04-29 NOTE — PATIENT INSTRUCTIONS
At [facility], we strive to deliver an exceptional experience to you, every time we see you. If you receive a survey, please complete it as we do value your feedback.  If you have MyChart, you can expect to receive results automatically within 24 hours of their completion.  Your provider will send a note interpreting your results as well.   If you do not have MyChart, you should receive your results in about a week by mail.    Your care team:                            Family Medicine Internal Medicine   MD Jose Roldan MD Shantel Branch-Fleming, MD Srinivasa Vaka, MD Katya Belousova, PAMYLENE Obrien, APRN CNP    Wyatt Vang, MD Pediatrics   Jesus Pastor, PAMYLENE Philip, CNP MD Snow Jules APRN CNP   MD Carmen Kelly MD Deborah Mielke, MD Monet Mendoza, APRN [facility]      Clinic hours: Monday - Thursday 7 am-6 pm; Fridays 7 am-5 pm.   Urgent care: Monday - Friday 10 am- 8 pm; Saturday and Sunday 9 am-5 pm.    Clinic: [phone]       [facility]: Monday - Thursday 8 am - 7 pm; Friday 8 am - 6 pm  [facility]: [phone]     Use [email or web address] for 24/7 diagnosis and treatment of dozens of conditions.

## 2021-08-01 ENCOUNTER — OFFICE VISIT (OUTPATIENT)
Dept: URGENT CARE | Facility: URGENT CARE | Age: 61
End: 2021-08-01
Payer: COMMERCIAL

## 2021-08-01 VITALS
RESPIRATION RATE: 22 BRPM | DIASTOLIC BLOOD PRESSURE: 76 MMHG | BODY MASS INDEX: 32.05 KG/M2 | WEIGHT: 229.8 LBS | HEART RATE: 60 BPM | SYSTOLIC BLOOD PRESSURE: 125 MMHG | TEMPERATURE: 97.9 F | OXYGEN SATURATION: 98 %

## 2021-08-01 DIAGNOSIS — M79.89 LEFT ARM SWELLING: ICD-10-CM

## 2021-08-01 DIAGNOSIS — Z23 NEED FOR DIPHTHERIA-TETANUS-PERTUSSIS (TDAP) VACCINE: ICD-10-CM
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
## 2021-08-01 DIAGNOSIS — S60.562A INSECT BITE OF LEFT HAND WITH INFECTION, INITIAL ENCOUNTER: ICD-10-CM

## 2021-08-01 DIAGNOSIS — L08.9 INSECT BITE OF LEFT HAND WITH INFECTION, INITIAL ENCOUNTER: ICD-10-CM

## 2021-08-01 DIAGNOSIS — T78.40XA ALLERGIC REACTION, INITIAL ENCOUNTER: ICD-10-CM

## 2021-08-01 DIAGNOSIS — S60.562A INSECT BITE OF LEFT HAND, INITIAL ENCOUNTER: Primary | ICD-10-CM
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
## 2021-08-01 PROCEDURE — 99214 OFFICE O/P EST MOD 30 MIN: CPT | Mod: 25 | Performed by: PHYSICIAN ASSISTANT

## 2021-08-01 PROCEDURE — 90715 TDAP VACCINE 7 YRS/> IM: CPT | Performed by: PHYSICIAN ASSISTANT

## 2021-08-01 PROCEDURE — 90471 IMMUNIZATION ADMIN: CPT | Performed by: PHYSICIAN ASSISTANT

## 2021-08-01 RX ORDER — PREDNISONE 20 MG/1
TABLET ORAL
Qty: 10 TABLET | Refills: 0 | Status: SHIPPED | OUTPATIENT
Start: 2021-08-01 | End: 2022-02-17

## 2021-08-01 RX ORDER — EPINEPHRINE 0.3 MG/.3ML
0.3 INJECTION SUBCUTANEOUS ONCE
Qty: 0.3 ML | Refills: 0 | Status: SHIPPED | OUTPATIENT
Start: 2021-08-01 | End: 2021-08-01

## 2021-08-01 RX ORDER — CEPHALEXIN 500 MG/1
500 CAPSULE ORAL 3 TIMES DAILY
Qty: 21 CAPSULE | Refills: 0 | Status: SHIPPED | OUTPATIENT
Start: 2021-08-01 | End: 2021-08-08

## 2021-08-01 NOTE — PROGRESS NOTES
Chief Complaint   Patient presents with     Insect Bites     stung by a yellow wasp on left hand yesterday afternoon, took benadryl this morning abouot 10 a.m.     Imm/Inj     Tetanus shot              Differential Diagnosis:  Rash: Cellulitis  Contact dermatitis  Drug eruption  Insect bite allergic reaction  Insect bite infection  Urticaria            ASSESSMENT:     ICD-10-CM    1. Insect bite of left hand, initial encounter  S60.562A predniSONE (DELTASONE) 20 MG tablet    W57.XXXA cephALEXin (KEFLEX) 500 MG capsule     EPINEPHrine (ANY BX GENERIC EQUIV) 0.3 MG/0.3ML injection 2-pack   2. Left arm swelling  M79.89 predniSONE (DELTASONE) 20 MG tablet     cephALEXin (KEFLEX) 500 MG capsule     EPINEPHrine (ANY BX GENERIC EQUIV) 0.3 MG/0.3ML injection 2-pack   3. Allergic reaction, initial encounter  T78.40XA predniSONE (DELTASONE) 20 MG tablet     EPINEPHrine (ANY BX GENERIC EQUIV) 0.3 MG/0.3ML injection 2-pack   4. Insect bite of left hand with infection, initial encounter  S60.562A cephALEXin (KEFLEX) 500 MG capsule    L08.9     W57.XXXA    5. Need for diphtheria-tetanus-pertussis (Tdap) vaccine  Z23 TDAP VACCINE (Adacel, Boostrix)  [[id number]]         PLAN: Localized swelling reaction to left hand and forearm secondary to wasp sting.  In addition, exam shows some redness and warmth to palpation which could signal early infection.  Continue Benadryl.  Will add prednisone.  Also Keflex for infection.  Continue ice and elevation.  Off work tomorrow.  Is a .  Given prescription for EpiPen in case subsequent wasp sting in the future produces a worse reaction.  Tetanus updated here today.    I have discussed clinical findings with patient.  Side effects of medications discussed.  Symptomatic care is discussed.  I have discussed the possibility of  worsening symptoms and indication to RTC or go to the ER if they occur.  All questions are answered, patient indicates understanding of these issues and is in  agreement with plan.   Patient care instructions are discussed/given at the end of visit.   Lots of rest and fluids.      Chely Clarke PA-C      SUBJECTIVE:  61-year-old male presents with his wife for evaluation of left hand wasp sting yesterday.  Entire hand and fingers are swollen.  Swelling even went up almost to the elbow.  Has been icing and elevating.  No prior reaction to insect bites.  No fever.  No tongue, lip, throat swelling.  No difficulty breathing.  Also here for tetanus update.  Last tetanus shot was .      No Known Allergies    Past Medical History:   Diagnosis Date     Chest pressure      Cough 2009     External hemorrhoids 2009     Gastritis; acute 2009     GERD (gastroesophageal reflux disease) 2009     Hiatal hernia 10/2009     Mild major depression (H) 2009       fluticasone (FLONASE) 50 MCG/ACT nasal spray, 1-2 sprays each nostril daily (Patient not taking: Reported on 2021)  ibuprofen (ADVIL/MOTRIN) 600 MG tablet, Take 1 tablet (600 mg) by mouth every 6 hours as needed for moderate pain (Patient not taking: Reported on 2021)    No current facility-administered medications on file prior to visit.      Social History     Tobacco Use     Smoking status: Former Smoker     Years: 25.00     Types: Cigarettes     Quit date: 1998     Years since quittin.5     Smokeless tobacco: Never Used   Substance Use Topics     Alcohol use: Yes     Comment: occ       ROS:  CONSTITUTIONAL: Negative for fatigue or fever.  EYES: Negative for eye problems.  ENT: Negative for sore throat   RESP: Negative for shortness of breath   CV: Negative for chest pains.  GI: Negative for vomiting.  MUSCULOSKELETAL: As above   NEUROLOGIC: Negative for headaches.  SKIN: As above   PSYCH: Normal mentation for age.    OBJECTIVE:  /76 (BP Location: Right arm, Patient Position: Sitting, Cuff Size: Adult Regular)   Pulse 60   Temp 97.9  F (36.6  C) (Oral)   Resp 22   Wt  104.2 kg (229 lb 12.8 oz)   SpO2 98%   BMI 32.05 kg/m    GENERAL APPEARANCE: Healthy, alert and no distress.  EYES:Conjunctiva/sclera clear.  EARS: No cerumen.   Ear canals w/o erythema.  TM's intact w/o erythema.    NOSE/MOUTH: Nose without ulcers, erythema or lesions.  SINUSES: No maxillary sinus tenderness.  THROAT: No erythema w/o tonsillar enlargement . No exudates.  NECK: Supple, nontender, no lymphadenopathy.  RESP: Lungs clear to auscultation - no rales, rhonchi or wheezes  CV: Regular rate and rhythm, normal S1 S2, no murmur noted.  NEURO: Awake, alert    SKIN: Right volar thumb area is with 1 mm raised punctate lesion consistent with insect bite.  Swelling of entire left hand all the way up to the mid forearm.  Feels mildly warm to palpation compared to the right.  Some areas of redness such as the knuckles.        Chely Clarke PA-C

## 2021-08-01 NOTE — LETTER
[facility] [address] [address]  Phone: [phone]    August 1, 2021        Jon Yun  [address] [place] [id number]          To whom it may concern:    RE: Jon Yun    Patient was seen and treated today at our clinic. Please excuse from work 8/2/2021. May return 8/2/2021 without restrictions.    Please contact me for questions or concerns.      Sincerely,        Chely Clarke PA-C

## 2021-10-23 ENCOUNTER — HEALTH MAINTENANCE LETTER (OUTPATIENT)
Age: 61
End: 2021-10-23

## 2021-12-30 ENCOUNTER — VIRTUAL VISIT (OUTPATIENT)
Dept: FAMILY MEDICINE | Facility: CLINIC | Age: 61
End: 2021-12-30
Payer: COMMERCIAL

## 2021-12-30 DIAGNOSIS — M54.2 NECK PAIN: Primary | ICD-10-CM

## 2021-12-30 PROCEDURE — 99212 OFFICE O/P EST SF 10 MIN: CPT | Mod: TEL | Performed by: FAMILY MEDICINE

## 2021-12-30 NOTE — PROGRESS NOTES
"Jon is a 61 year old who is being evaluated via a billable telephone visit.      What phone number would you like to be contacted at? [phone]   How would you like to obtain your AVS? MyChart    Assessment & Plan     Neck pain  Recent fall with neck injury and pain, likely musculoskeletal in nature, management discussed with patient, continue with activity modification, ice, over-the-counter work note was written today, patient instructed to follow-up with PCP if not improving or if he develops new symptoms.  Analgesic,      Review of external notes as documented elsewhere in note  12 minutes spent on the date of the encounter doing chart review, review of outside records, review of test results, interpretation of tests, patient visit and documentation        BMI:   Estimated body mass index is 32.05 kg/m  as calculated from the following:    Height as of 4/27/21: 1.803 m (5' 11\").    Weight as of 8/1/21: 104.2 kg (229 lb 12.8 oz).           No follow-ups on file.    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  Current work seems to be exacerbated his neck pain especially when he does overtime work.        Review of Systems   Constitutional, HEENT, cardiovascular, pulmonary, gi and gu systems are negative, except as otherwise noted.      Objective         Vitals:  No vitals were obtained today due to virtual visit.    Physical Exam   healthy, alert and no distress  PSYCH: Alert and oriented times 3; coherent speech, normal   rate and volume, able to articulate logical thoughts, able "   to abstract reason, no tangential thoughts, no hallucinations   or delusions  His affect is normal  RESP: No cough, no audible wheezing, able to talk in full sentences  Remainder of exam unable to be completed due to telephone visits      Phone call duration: 12 minutes

## 2021-12-30 NOTE — LETTER
[facility] [address] [place] [address]  Phone: [phone]  Fax: [phone]    December 30, 2021        Jon Yun  [address] [place] [id number]          To whom it may concern:    RE: Jon Yun    Patient was seen and treated today at our clinic and missed work 12/30 and 12/31.    Please contact me for questions or concerns.      Sincerely,        Jaime Marion MD

## 2022-01-29 ENCOUNTER — NURSE TRIAGE (OUTPATIENT)
Dept: NURSING | Facility: CLINIC | Age: 62
End: 2022-01-29
Payer: COMMERCIAL

## 2022-01-29 NOTE — TELEPHONE ENCOUNTER
Triage call  Wife calling to report that they went and had a rapid covid test and they found they were positive.  Patient gave verbal permission to talk with wife about his health care.  Patient has fever nausea and fatigue. She had questions about isolation and how to treat it.  He did not want a virtual vist but will call back if he feels worse.    Per protocol home care,Care advice given.  Verbalizes understanding and agrees with plan.    Helena De León RN   [facility] Nurse Advisor  3:58 PM 1/29/2022        Reason for Disposition    COVID-19 Testing, questions about    Additional Information    Negative: SEVERE difficulty breathing (e.g., struggling for each breath, speaks in single words)    Negative: Difficult to awaken or acting confused (e.g., disoriented, slurred speech)    Negative: Bluish (or gray) lips or face now    Negative: Shock suspected (e.g., cold/pale/clammy skin, too weak to stand, low BP, rapid pulse)    Negative: Sounds like a life-threatening emergency to the triager    Negative: [1] COVID-19 exposure AND [2] no symptoms    Negative: COVID-19 vaccine reaction suspected (e.g., fever, headache, muscle aches) occurring 1 to 3 days after getting vaccine    Negative: COVID-19 vaccine, questions about    Negative: [1] Lives with someone known to have influenza (flu test positive) AND [2] flu-like symptoms (e.g., cough, runny nose, sore throat, SOB; with or without fever)    Negative: [1] Adult with possible COVID-19 symptoms AND [2] triager concerned about severity of symptoms or other causes    Negative: COVID-19 and breastfeeding, questions about    Negative: SEVERE or constant chest pain or pressure (Exception: mild central chest pain, present only when coughing)    Negative: MODERATE difficulty breathing (e.g., speaks in phrases, SOB even at rest, pulse 100-120)    Negative: [1] Headache AND [2] stiff neck (can't touch chin to chest)    Negative: MILD difficulty breathing (e.g.,  minimal/no SOB at rest, SOB with walking, pulse <100)    Negative: Chest pain or pressure    Negative: Patient sounds very sick or weak to the triager    Negative: Fever > 103 F (39.4 C)    Negative: [1] Fever > 101 F (38.3 C) AND [2] age > 60 years    Negative: [1] Fever > 100.0 F (37.8 C) AND [2] bedridden (e.g., nursing home patient, CVA, chronic illness, recovering from surgery)    Negative: HIGH RISK for severe COVID complications (e.g., age > 64 years, obesity with BMI > 25, pregnant, chronic lung disease or other chronic medical condition)  (Exception: Already seen by PCP and no new or worsening symptoms.)    Negative: [1] HIGH RISK patient AND [2] influenza is widespread in the community AND [3] ONE OR MORE respiratory symptoms: cough, sore throat, runny or stuffy nose    Negative: [1] HIGH RISK patient AND [2] influenza exposure within the last 7 days AND [3] ONE OR MORE respiratory symptoms: cough, sore throat, runny or stuffy nose    Negative: [1] COVID-19 infection suspected by caller or triager AND [2] mild symptoms (cough, fever, or others) AND [3] negative COVID-19 rapid test    Negative: Fever present > 3 days (72 hours)    Negative: [1] Fever returns after gone for over 24 hours AND [2] symptoms worse or not improved    Negative: [1] Continuous (nonstop) coughing interferes with work or school AND [2] no improvement using cough treatment per protocol    Negative: Cough present > 3 weeks    Negative: COVID-19 Home Isolation, questions about    Negative: [1] COVID-19 diagnosed AND [2] has mild nausea, vomiting or diarrhea    Negative: [1] COVID-19 diagnosed by doctor (or NP/PA) AND [2] mild symptoms (e.g., cough, fever, others) AND [3] no complications or SOB    Negative: [1] COVID-19 diagnosed by positive lab test AND [2] mild symptoms (e.g., cough, fever, others) AND [3] no complications or SOB    Negative: [1] COVID-19 diagnosed by positive lab test AND [2] NO symptoms (e.g., cough, fever,  others)    Protocols used: CORONAVIRUS (COVID-19) DIAGNOSED OR [id number] 8.25.2021

## 2022-02-03 ENCOUNTER — NURSE TRIAGE (OUTPATIENT)
Dept: NURSING | Facility: CLINIC | Age: 62
End: 2022-02-03
Payer: COMMERCIAL

## 2022-02-03 NOTE — LETTER
[facility]  [address] [address]  [phone]            February 3, 2022    To whom it may concern:    Jon Court  64 is okay to return to work 2022.            Sincerely,                      Baciloi Mckoy MD

## 2022-02-03 NOTE — LETTER
[facility]  [address] [address]  [phone]            February 4, 2022    To whom it may concern:    Jon Court missed work due to illness.    Advise return to work 2-8-22.                Sincerely,                    Bacilio Mckoy MD

## 2022-02-03 NOTE — TELEPHONE ENCOUNTER
Was DX positive for covid on 1/29/2022.  Symptoms are:    Productive cough    Fever    Sore throat    Fatigue    Received x 2 doses of Moderna vaccine.  Works as a  and walks from 12-14 miles per day.  Requesting a return to work letter.    Maribell Garcia RN, [facility] Triage Nurse Advisor    Reason for Disposition    COVID-19 Home Isolation, questions about    Protocols used: CORONAVIRUS (COVID-19) DIAGNOSED OR [id number] 8.25.2021

## 2022-02-04 NOTE — TELEPHONE ENCOUNTER
Reached out to patient and wife at both numbers on file.    Not available- will need to attempt at a later time.    SUAD MorrisN RN  [facility] [place]

## 2022-02-04 NOTE — TELEPHONE ENCOUNTER
Attempted to notify patient. No voicemail. Please try again later.     Ingris HSU RN  [facility]

## 2022-02-04 NOTE — TELEPHONE ENCOUNTER
Patient is still experiencing symptoms and would like letter to indicate return to work 2/8/22 instead of today.    SUAD MorrisN RN  [facility], [place]

## 2022-02-04 NOTE — TELEPHONE ENCOUNTER
I did letter allowing him to return to work 2-4-2022, but to clarify he needs to have improving symptoms and be fever free for 24 hours (  Without help of tylenol or ibuprofen ).      Please inform patient. He can print out prescription from [facility].    Bacilio Mckoy MD

## 2022-02-12 ENCOUNTER — HEALTH MAINTENANCE LETTER (OUTPATIENT)
Age: 62
End: 2022-02-12

## 2022-02-17 ENCOUNTER — ANCILLARY PROCEDURE (OUTPATIENT)
Dept: GENERAL RADIOLOGY | Facility: CLINIC | Age: 62
End: 2022-02-17
Attending: FAMILY MEDICINE
Payer: COMMERCIAL

## 2022-02-17 ENCOUNTER — OFFICE VISIT (OUTPATIENT)
Dept: FAMILY MEDICINE | Facility: CLINIC | Age: 62
End: 2022-02-17
Payer: COMMERCIAL

## 2022-02-17 VITALS
HEIGHT: 71 IN | BODY MASS INDEX: 32.81 KG/M2 | HEART RATE: 67 BPM | SYSTOLIC BLOOD PRESSURE: 128 MMHG | WEIGHT: 234.38 LBS | DIASTOLIC BLOOD PRESSURE: 68 MMHG | TEMPERATURE: 97 F

## 2022-02-17 DIAGNOSIS — Z86.16 HISTORY OF 2019 NOVEL CORONAVIRUS DISEASE (COVID-19): ICD-10-CM

## 2022-02-17 DIAGNOSIS — M54.50 MIDLINE LOW BACK PAIN WITHOUT SCIATICA, UNSPECIFIED CHRONICITY: Primary | ICD-10-CM

## 2022-02-17 DIAGNOSIS — K59.00 CONSTIPATION, UNSPECIFIED CONSTIPATION TYPE: ICD-10-CM

## 2022-02-17 DIAGNOSIS — Z12.2 SCREENING FOR LUNG CANCER: ICD-10-CM

## 2022-02-17 PROCEDURE — 72100 X-RAY EXAM L-S SPINE 2/3 VWS: CPT | Performed by: RADIOLOGY

## 2022-02-17 PROCEDURE — 99214 OFFICE O/P EST MOD 30 MIN: CPT | Performed by: FAMILY MEDICINE

## 2022-02-17 ASSESSMENT — PAIN SCALES - GENERAL: PAINLEVEL: NO PAIN (0)

## 2022-02-17 NOTE — PROGRESS NOTES
"        Antonio Webb is a 61 year old who presents for the following health issues     History of Present Illness       Back Pain:  He presents for follow up of back pain. Patient's back pain is a recurring problem.  Location of back pain:  Right lower back and left lower back  Description of back pain: dull ache  Back pain spreads: nowhere    Since patient first noticed back pain, pain is: always present, but gets better and worse  Does back pain interfere with his job:  Yes    Reason for visit:  Checking back, lungs  Symptom onset:  More than a month  Symptoms include:  Sore back , occasionally cough. Some occasional leg pain  Symptom intensity:  Moderate  Symptom progression:  Staying the same  Had these symptoms before:  Yes  Has tried/received treatment for these symptoms:  No  What makes it worse:  Too much use    He eats 2-3 servings of fruits and vegetables daily.He consumes 0 sweetened beverage(s) daily.He exercises with enough effort to increase his heart rate 60 or more minutes per day.  He exercises with enough effort to increase his heart rate 5 days per week.   He is taking medications regularly.        Review of Systems   Working  A lot  Has to work overtime    Walk 12 miles daily    On overtime 15 miles daily        Had covid recently   Throat was sore    Dry cough ( had this even  Before covid )     No meds for back      Objective    /68 (BP Location: Right arm, Patient Position: Chair, Cuff Size: Adult Regular)   Pulse 67   Temp 97  F (36.1  C) (Temporal)   Ht 1.803 m (5' 11\")   Wt 106.3 kg (234 lb 6 oz)   BMI 32.69 kg/m    Body mass index is 32.69 kg/m .  Physical Exam  Constitutional:       Appearance: He is well-developed.   HENT:      Head: Normocephalic and atraumatic.   Eyes:      Conjunctiva/sclera: Conjunctivae normal.   Neck:      Vascular: No carotid bruit.   Cardiovascular:      Rate and Rhythm: Normal rate and regular rhythm.      Heart sounds: Normal " heart sounds.   Pulmonary:      Effort: Pulmonary effort is normal. No respiratory distress.      Breath sounds: Normal breath sounds.   Neurological:      Mental Status: He is alert and oriented to person, place, and time.      Cranial Nerves: No cranial nerve deficit.   Psychiatric:         Speech: Speech normal.         Behavior: Behavior normal.      patient points to general low  Back area when asked where pain is; no pont tenderness    No rib tenderness    Sensation and strength are normal in both lower extremities.  Negative straight leg raising test bilaterally.  Able get up on heels and toes normally.  No pain on axial loading.     Range of motion fair         xray lumbar spine done here, normal spine but very large amount of stool present        ASSESSMENT / PLAN:  (M54.50) Midline low back pain without sciatica, unspecified chronicity  (primary encounter diagnosis)  Comment: overall xray and exam reassuring.  Likely chronic soft tissue back pain.  The stool/ constipation may also be contributing  Plan: XR Lumbar Spine 2/3 Views             (Z12.2) Screening for lung cancer  Comment: discussed in detail.  Patient never had this done last time  Plan: CT Chest Lung Cancer Scrn Low Dose wo        Patient to schedule    (K59.00) Constipation, unspecified constipation type  Comment: this may be contributing to back symptoms.   Plan: advised therapeutic trial with miralax to clear this out.  Call us next week with update    (Z86.16) History of 2019 novel coronavirus disease (COVID-19)  Comment: symptoms slowly resolving   Plan: follow up prn       Also see letter done for patient       I reviewed the patient's medications, allergies, medical history, family history, and social history.    Bacilio Mckoy MD

## 2022-02-17 NOTE — PATIENT INSTRUCTIONS
Use over the counter miralax one serving 1-2 x daily for a few days to clean out bowels; this is a therapeutic trial to see if back pain improves    Call Dr. Mckoy next week with update    Schedule CT scan

## 2022-02-17 NOTE — LETTER
[facility]  [address]  [place] [address]  [phone]              2022    To whom it may concern:    Jon Yun  64 is a patient of mine.  Due to medical concerns advise he be excused from overtime at work.  The overtime is causing symptoms for him.            Sincerely,                    Bacilio Mckoy MD

## 2022-02-21 ENCOUNTER — ANCILLARY PROCEDURE (OUTPATIENT)
Dept: CT IMAGING | Facility: CLINIC | Age: 62
End: 2022-02-21
Attending: FAMILY MEDICINE
Payer: COMMERCIAL

## 2022-02-21 DIAGNOSIS — Z12.2 SCREENING FOR LUNG CANCER: ICD-10-CM

## 2022-02-21 PROCEDURE — 71271 CT THORAX LUNG CANCER SCR C-: CPT | Mod: TC | Performed by: RADIOLOGY

## 2022-02-22 ENCOUNTER — MYC MEDICAL ADVICE (OUTPATIENT)
Dept: FAMILY MEDICINE | Facility: CLINIC | Age: 62
End: 2022-02-22

## 2022-02-22 PROBLEM — R91.8 PULMONARY NODULES: Status: ACTIVE | Noted: 2022-02-22

## 2022-02-22 NOTE — LETTER
[facility]  [address]  [place] [address]  [phone]           2022    To whom it may concern     Jon Yun  64 is a patient of ours.  He missed work yesterday and today and will miss tomorrow also for medical reasons.             Sincerely,                 Bacilio Mckoy MD

## 2022-02-22 NOTE — LETTER
[facility]  [address]  [place] [address]  [phone]          2022    To whom it may concern:    Jon Court  64 will miss work today and tomorrow for medical reasons.            Sincerely,                       Bacilio Mckoy MD

## 2022-02-23 ENCOUNTER — TELEPHONE (OUTPATIENT)
Dept: GASTROENTEROLOGY | Facility: CLINIC | Age: 62
End: 2022-02-23
Payer: COMMERCIAL

## 2022-02-23 DIAGNOSIS — Z11.59 ENCOUNTER FOR SCREENING FOR OTHER VIRAL DISEASES: Primary | ICD-10-CM

## 2022-02-23 NOTE — TELEPHONE ENCOUNTER
Screening Questions  Blue=prep questions Red=location Green=sedation   1. Are you active on mychart? Y    2. What insurance is in the chart? [employer]     3.  Ordering/Referring Provider: Bacilio Mckoy MD     4. BMI 32.6, If greater than 40 review exclusion criteria also will need EXTENDED PREP    5.  Respiratory Screening (If yes to any of the following HOSPITAL setting only):     Do you use daily home oxygen? N  Do you have mod to severe Obstructive Sleep Apnea? N (can be seen at [facility] or hospital setting)    Do you have Pulmonary Hypertension? N   Do you have UNCONTROLLED asthma? N    6. Have you had a heart or lung transplant? N  (If yes, please review exclusion criteria)    7. Are you currently on dialysis? N  (If yes, schedule in HOSPITAL setting only)(If yes, please send Golytely prep)    8. Do you have chronic kidney disease? N (If yes, please send Golytely prep)    9. Have you had a stroke or Transient ischemic attack (TIA) within 6 months? N (If yes, do not schedule at [facility])    10. In the past 6 months, have you had any heart related issues including cardiomyopathy or heart attack? N (If yes, please review exclusion criteria)           If yes, did it require cardiac stenting or other implantable device? N  (If yes, please review exclusion criteria)      11. Do you have any implantable devices in your body (pacemaker, defib, LVAD)? N (If yes, schedule at [facility])    12. Do you take nitroglycerin? If yes, how often? N (if yes, schedule at HOSPITAL setting)    13. Are you currently taking any blood thinners? N (If yes- inform patient to follow up with PCP or provider for follow up instructions)     14. Are you a diabetic? N (If yes, please send Golytely prep)    15. (Females) Are you currently pregnant?   If yes, how many weeks?      16. Are you taking any prescription pain medications on a routine schedule? N If yes, MAC sedation and patient will need EXTENDED PREP.    17. Do you have any chemical dependencies such  as alcohol, street drugs, or methadone? N If yes, MAC sedation     18. Do you have any history of post-traumatic stress syndrome, severe anxiety or history of psychosis? N  If yes, MAC sedation.     19. Do you transfer independently? Y    20.  Do you have any issues with constipation? N   If yes, pt will need EXTENDED PREP     21. Preferred Pharmacy for Pre Prescription [id number] - [facility] [address] AT [address]    Scheduling Details    Which Colonoscopy Prep was Sent?: Miralax  Type of Procedure Scheduled: Colon  Surgeon: Leonila  Date of Procedure: 3/15  Location: [place]   Caller (Please ask for phone number if not scheduled by patient): Neisha Yun [phone]      Sedation Type: CS  Conscious Sedation- Needs  for 6 hours after the procedure  MAC/General-Needs  for 24 hours after procedure    Pre-op Required at [facility], [place], [facility] and OR for MAC sedation:   (if yes advise patient they will need a pre-op prior to procedure)      Informed patient they will need an adult  Y  Cannot take any type of public or medical transportation alone    Pre-Procedure Covid test to be completed at [facility] or Externally: Yes, 3/11 Mike    Confirmed Nurse will call to complete assessment Y    Additional comments:  ([facility] PATIENTS NEED EXTENDED PREP)

## 2022-02-24 NOTE — TELEPHONE ENCOUNTER
I called and discussed in detail with patient today 2-24.  See letter.  He has video consult scheduled for lung follow up and also has colonoscopy scheduled.  Try to stay active physically.    Bacilio Mckoy MD

## 2022-03-06 ENCOUNTER — NURSE TRIAGE (OUTPATIENT)
Dept: NURSING | Facility: CLINIC | Age: 62
End: 2022-03-06
Payer: COMMERCIAL

## 2022-03-06 NOTE — TELEPHONE ENCOUNTER
Triage call:     Patient and his wife, Neisha calling to inquire about colonoscopy instructions. He is scheduled 3/15 at [facility].   Advised to follow up with clinic Monday morning to advise.   THey verbalize understanding.     Soha Garibay RN   03/06/22 7:47 AM  [facility] Nurse Advisor    Reason for Disposition    [1] Caller requesting NON-URGENT health information AND [2] PCP's office is the best resource    Additional Information    Negative: Requesting regular office appointment    Negative: RN needs further essential information from caller in order to complete triage    Negative: [1] Caller is not with the adult (patient) AND [2] reporting urgent symptoms    Negative: Lab result questions    Negative: Medication questions    Negative: Caller can't be reached by phone    Negative: Caller has already spoken to PCP or another triager    Protocols used: INFORMATION ONLY CALL - NO TRIAGE-A-

## 2022-03-10 RX ORDER — TADALAFIL 5 MG/1
TABLET ORAL
COMMUNITY
Start: 2022-02-18

## 2022-03-10 RX ORDER — EPINEPHRINE 0.3 MG/.3ML
INJECTION SUBCUTANEOUS
COMMUNITY
Start: 2021-08-01

## 2022-03-11 ENCOUNTER — LAB (OUTPATIENT)
Dept: LAB | Facility: CLINIC | Age: 62
End: 2022-03-11
Payer: COMMERCIAL

## 2022-03-11 DIAGNOSIS — Z11.59 ENCOUNTER FOR SCREENING FOR OTHER VIRAL DISEASES: ICD-10-CM

## 2022-03-11 PROCEDURE — U0005 INFEC AGEN DETEC AMPLI PROBE: HCPCS

## 2022-03-11 PROCEDURE — U0003 INFECTIOUS AGENT DETECTION BY NUCLEIC ACID (DNA OR RNA); SEVERE ACUTE RESPIRATORY SYNDROME CORONAVIRUS 2 (SARS-COV-2) (CORONAVIRUS DISEASE [COVID-19]), AMPLIFIED PROBE TECHNIQUE, MAKING USE OF HIGH THROUGHPUT TECHNOLOGIES AS DESCRIBED BY CMS-2020-01-R: HCPCS

## 2022-03-12 LAB — SARS-COV-2 RNA RESP QL NAA+PROBE: NEGATIVE

## 2022-03-15 ENCOUNTER — HOSPITAL ENCOUNTER (OUTPATIENT)
Facility: AMBULATORY SURGERY CENTER | Age: 62
Discharge: HOME | End: 2022-03-15
Attending: SURGERY | Admitting: SURGERY
Payer: COMMERCIAL

## 2022-03-15 VITALS
SYSTOLIC BLOOD PRESSURE: 108 MMHG | RESPIRATION RATE: 16 BRPM | DIASTOLIC BLOOD PRESSURE: 67 MMHG | OXYGEN SATURATION: 95 % | TEMPERATURE: 97.5 F | HEART RATE: 70 BPM

## 2022-03-15 LAB — COLONOSCOPY: NORMAL

## 2022-03-15 PROCEDURE — 45378 DIAGNOSTIC COLONOSCOPY: CPT

## 2022-03-15 PROCEDURE — G0121 COLON CA SCRN NOT HI RSK IND: HCPCS | Performed by: SURGERY

## 2022-03-15 PROCEDURE — G8918 PT W/O PREOP ORDER IV AB PRO: HCPCS

## 2022-03-15 PROCEDURE — G8907 PT DOC NO EVENTS ON DISCHARG: HCPCS

## 2022-03-15 PROCEDURE — 99152 MOD SED SAME PHYS/QHP 5/>YRS: CPT | Mod: PT | Performed by: SURGERY

## 2022-03-15 RX ORDER — ONDANSETRON 4 MG/1
4 TABLET, ORALLY DISINTEGRATING ORAL EVERY 6 HOURS PRN
Status: DISCONTINUED | OUTPATIENT
Start: 2022-03-15 | End: 2022-03-16 | Stop reason: HOSPADM

## 2022-03-15 RX ORDER — FENTANYL CITRATE 50 UG/ML
INJECTION, SOLUTION INTRAMUSCULAR; INTRAVENOUS PRN
Status: DISCONTINUED | OUTPATIENT
Start: 2022-03-15 | End: 2022-03-15 | Stop reason: HOSPADM

## 2022-03-15 RX ORDER — ONDANSETRON 2 MG/ML
4 INJECTION INTRAMUSCULAR; INTRAVENOUS
Status: DISCONTINUED | OUTPATIENT
Start: 2022-03-15 | End: 2022-03-16 | Stop reason: HOSPADM

## 2022-03-15 RX ORDER — NALOXONE HYDROCHLORIDE 0.4 MG/ML
0.2 INJECTION, SOLUTION INTRAMUSCULAR; INTRAVENOUS; SUBCUTANEOUS
Status: DISCONTINUED | OUTPATIENT
Start: 2022-03-15 | End: 2022-03-16 | Stop reason: HOSPADM

## 2022-03-15 RX ORDER — LIDOCAINE 40 MG/G
CREAM TOPICAL
Status: DISCONTINUED | OUTPATIENT
Start: 2022-03-15 | End: 2022-03-16 | Stop reason: HOSPADM

## 2022-03-15 RX ORDER — PROCHLORPERAZINE MALEATE 10 MG
10 TABLET ORAL EVERY 6 HOURS PRN
Status: DISCONTINUED | OUTPATIENT
Start: 2022-03-15 | End: 2022-03-16 | Stop reason: HOSPADM

## 2022-03-15 RX ORDER — NALOXONE HYDROCHLORIDE 0.4 MG/ML
0.4 INJECTION, SOLUTION INTRAMUSCULAR; INTRAVENOUS; SUBCUTANEOUS
Status: DISCONTINUED | OUTPATIENT
Start: 2022-03-15 | End: 2022-03-16 | Stop reason: HOSPADM

## 2022-03-15 RX ORDER — ONDANSETRON 2 MG/ML
4 INJECTION INTRAMUSCULAR; INTRAVENOUS EVERY 6 HOURS PRN
Status: DISCONTINUED | OUTPATIENT
Start: 2022-03-15 | End: 2022-03-16 | Stop reason: HOSPADM

## 2022-03-15 RX ORDER — FLUMAZENIL 0.1 MG/ML
0.2 INJECTION, SOLUTION INTRAVENOUS
Status: ACTIVE | OUTPATIENT
Start: 2022-03-15 | End: 2022-03-15

## 2022-03-22 ENCOUNTER — VIRTUAL VISIT (OUTPATIENT)
Dept: PULMONOLOGY | Facility: CLINIC | Age: 62
End: 2022-03-22
Attending: FAMILY MEDICINE
Payer: COMMERCIAL

## 2022-03-22 DIAGNOSIS — R91.8 PULMONARY NODULES: ICD-10-CM

## 2022-03-22 PROCEDURE — 99204 OFFICE O/P NEW MOD 45 MIN: CPT | Mod: 95 | Performed by: INTERNAL MEDICINE

## 2022-03-22 NOTE — PROGRESS NOTES
"nola is a 61 year old who is being evaluated via a billable video visit.      How would you like to obtain your AVS? MyChart  If the video visit is dropped, the invitation should be resent by: Text to cell phone: [phone]  Will anyone else be joining your video visit? No    LUNG NODULE & INTERVENTIONAL PULMONARY CLINIC  [facility] [place], [facility], [facility]   VIDEO VISIT    Jon Yun MRN# [id number]   Age: 61 year old YOB: 1960     Reason for Consultation: lung nodule(s)    Requesting Physician: Bacilio Mckoy MD  [address] [place] [address]    The patient has been notified of following:   \"This video visit will be conducted via a call between you and your physician/provider. We have found that certain health care needs can be provided without the need for an in-person physical exam.  This service lets us provide the care you need with a video conversation.  If a prescription is necessary we can send it directly to your pharmacy.  If lab work is needed we can place an order for that and you can then stop by our lab to have the test done at a later time.  Video visits are billed at different rates depending on your insurance coverage.  Please reach out to your insurance provider with any questions.  If during the course of the call the physician/provider feels a video visit is not appropriate, you will not be charged for this service.\"  Patient has given verbal consent for Video visit? Yes  How would you like to obtain your AVS? Please refer to rooming staff note  Patient would like the video invitation sent by: Please refer to rooming staff note   Will anyone else be joining your video visit? Please refer to rooming staff note    Video-Visit Details     Type of service:  Video Visit  Video Start Time: 340  Video End Time: 400  Originating Location (pt. Location): Home  Distant Location (provider location): Home/Clinic  Platform used " for Video Visit: Triston/Maryann Mock MD    Assessment and Plan:    1. New multiple pulmonary lung nodule(s). Given the characteristics on current/previous imaging and risk factors; I would classify this to be Intermediate (6-65%) risk for cancer. Culprit nodule is 10mm in RLL. Plan to follow Fleischner Criteria. Next CT in 3mo.     2. Large hiatal hernia. His sx are minimal. Will hold on thoracic consult for now.     Billing: I spent 45-59min (New, 00406) on the date of the encounter doing chart review, history and exam, documentation and further activities as described in this note.    Shahriar Mock MD   of Medicine  Interventional Pulmonology  Department of Pulmonary, Allergy, Critical Care and Sleep Medicine   [facility] [employer]  Pager: [phone]          History:     Jon Yun is a 61 year old male with sig h/o for GERD, MDD who is here for evaluation/followup of lung nodule(s).    - No new resp sx or complaints. He does c/o dyspnea, abdominal pressure.   - here for evaluation of nodules  - Personal hx of cancer: no   - Family hx of cancer: dad had lung cancer   - Exposure hx: Denies asbestos or radon exposure   - Tobacco hx: Past Smoker: 2ppd since 25yrs. Quit 25yrs ago.   - My interpretation of the images relevant for this visit includes: nodules   - My interpretation of the PFT's relevant for this visit includes: None     Culprit Nodule(s):   1. Multiple bilateral lung nodule(s) that are sub 10 mm. First seen by chest CT on 2/21/22. First 2. observed on this date   3. Largest nodules is RLL 10mm nodules.   4. Large Hiatal hernia    Other active medical problems include:   - has GERD. stable   - has MDD. Stable            Past Medical History:      Past Medical History:   Diagnosis Date     Chest pressure      Cough 8/13/2009     External hemorrhoids 8/13/2009     Gastritis; acute 8/13/2009     GERD (gastroesophageal reflux disease) 8/13/2009     Hiatal  hernia 10/2009     Mild major depression (H) 2009             Past Surgical History:      Past Surgical History:   Procedure Laterality Date     COLONOSCOPY       ENDOSCOPY UPPER, COLONOSCOPY, COMBINED  10/23/2012    Procedure: COMBINED ENDOSCOPY UPPER, COLONOSCOPY;  EGD, COLONOSCOPY, GERD, HISTORY OF CANCER, polypectomy in colon, biopsies of antrum & esophagus;  Surgeon: Corbin Slaughter MD;  Location: [facility] OR     HC RECONSTR NOSE+SOULEYMANE SEPTAL REPAIR       ZZC OPEN RX ANKLE DISLOCATN              Social History:     Social History     Tobacco Use     Smoking status: Former Smoker     Years: 25.00     Types: Cigarettes     Quit date: 1998     Years since quittin.2     Smokeless tobacco: Never Used   Substance Use Topics     Alcohol use: Yes     Comment: occ            Family History:     Family History   Problem Relation Age of Onset     Thyroid Disease Mother      Cancer Father 74        Lung cancer     Cardiovascular Maternal Grandmother         Pacemaker age 67     Cancer Maternal Grandfather              Allergies:      Allergies   Allergen Reactions     Bees Swelling            Medications:     Current Outpatient Medications   Medication Sig     EPINEPHrine (ANY BX GENERIC EQUIV) 0.3 MG/0.3ML injection 2-pack INJECT 0.3 ML IN THE MUSCLE ONCE FOR 1 DOSE AS DIRECTED     fluticasone (FLONASE) 50 MCG/ACT nasal spray 1-2 sprays each nostril daily     tadalafil (CIALIS) 5 MG tablet TAKE ONE TABLET BY MOUTH EVERY DAY, MAY TAKE AN ADDITIONAL 1-3 TABLETS ONE HOUR PRIOR TO SEXUAL ACTIVITY     No current facility-administered medications for this visit.            Review of Systems:     CONSTITUTIONAL: negative for fever, chills, change in weight  INTEGUMENTARY/SKIN: no rash or obvious new lesions  ENT/MOUTH: no sore throat, new sinus pain or nasal drainage  RESP: see interval history  CV: negative for chest pain, palpitations or peripheral edema  GI: no nausea, vomiting, change in stools  : no  dysuria  MUSCULOSKELETAL: no myalgias, arthralgias  ENDOCRINE: blood sugars with adequate control  PSYCHIATRIC: mood stable  LYMPHATIC: no new lymphadenopathy  HEME: no bleeding or easy bruisability  NEURO: no numbness, weakness, headaches         Physical Exam:     Constitutional - looks well, in no apparent distress  Eyes - no redness or discharge  Respiratory -breathing appears comfortable.  No cough.  Skin - No appreciable discoloration or lesions (very limited exam)  Neurological - No apparent tremors. Speech fluent and articlate  Psychiatric - no signs of delirium or anxiety     Exam limited to that easily identified on a virtual visit. The rest of a comprehensive physical examination is deferred due to [facility] (public health emergency) video visit restrictions.         Current Laboratory Data:   All laboratory and imaging data reviewed.           Previous Cardiology Imaging   No results found for this or any previous visit (from the past 8760 hour(s)).

## 2022-03-22 NOTE — LETTER
"3/22/2022       RE: Jon Yun  [address] [address]     Dear Colleague,    Thank you for referring your patient, Jon Yun, [facility]. Please see a copy of my visit note below.    nola is a 61 year old who is being evaluated via a billable video visit.      How would you like to obtain your AVS? MyChart  If the video visit is dropped, the invitation should be resent by: Text to cell phone: [phone]  Will anyone else be joining your video visit? No    LUNG NODULE & INTERVENTIONAL PULMONARY CLINIC  CLINICS & SURGERY [place], [facility]   VIDEO VISIT    Jon Yun MRN# [id number]   Age: 61 year old YOB: 1960     Reason for Consultation: lung nodule(s)    Requesting Physician: Bacilio Mckoy MD  [address] [place] [address]    The patient has been notified of following:   \"This video visit will be conducted via a call between you and your physician/provider. We have found that certain health care needs can be provided without the need for an in-person physical exam.  This service lets us provide the care you need with a video conversation.  If a prescription is necessary we can send it directly to your pharmacy.  If lab work is needed we can place an order for that and you can then stop by our lab to have the test done at a later time.  Video visits are billed at different rates depending on your insurance coverage.  Please reach out to your insurance provider with any questions.  If during the course of the call the physician/provider feels a video visit is not appropriate, you will not be charged for this service.\"  Patient has given verbal consent for Video visit? Yes  How would you like to obtain your AVS? Please refer to rooming staff note  Patient would like the video invitation sent by: Please refer to " rooming staff note   Will anyone else be joining your video visit? Please refer to rooming staff note    Video-Visit Details     Type of service:  Video Visit  Video Start Time: 340  Video End Time: 400  Originating Location (pt. Location): Home  Distant Location (provider location): Home/Clinic  Platform used for Video Visit: [employer]     Shahriar Mock MD    Assessment and Plan:    1. New multiple pulmonary lung nodule(s). Given the characteristics on current/previous imaging and risk factors; I would classify this to be Intermediate (6-65%) risk for cancer. Culprit nodule is 10mm in RLL. Plan to follow Fleischner Criteria. Next CT in 3mo.     2. Large hiatal hernia. His sx are minimal. Will hold on thoracic consult for now.     Billing: I spent 45-59min (New, 61516) on the date of the encounter doing chart review, history and exam, documentation and further activities as described in this note.    Shahriar Mock MD   of Medicine  Interventional Pulmonology  Department of Pulmonary, Allergy, Critical Care and Sleep Medicine   [facility]  Pager: [phone]          History:     Jon Yun is a 61 year old male with sig h/o for GERD, MDD who is here for evaluation/followup of lung nodule(s).    - No new resp sx or complaints. He does c/o dyspnea, abdominal pressure.   - here for evaluation of nodules  - Personal hx of cancer: no   - Family hx of cancer: dad had lung cancer   - Exposure hx: Denies asbestos or radon exposure   - Tobacco hx: Past Smoker: 2ppd since 25yrs. Quit 25yrs ago.   - My interpretation of the images relevant for this visit includes: nodules   - My interpretation of the PFT's relevant for this visit includes: None     Culprit Nodule(s):   1. Multiple bilateral lung nodule(s) that are sub 10 mm. First seen by chest CT on 2/21/22. First 2. observed on this date   3. Largest nodules is RLL 10mm nodules.   4. Large Hiatal hernia    Other active  medical problems include:   - has GERD. stable   - has MDD. Stable            Past Medical History:      Past Medical History:   Diagnosis Date     Chest pressure      Cough 2009     External hemorrhoids 2009     Gastritis; acute 2009     GERD (gastroesophageal reflux disease) 2009     Hiatal hernia 10/2009     Mild major depression (H) 2009             Past Surgical History:      Past Surgical History:   Procedure Laterality Date     COLONOSCOPY       ENDOSCOPY UPPER, COLONOSCOPY, COMBINED  10/23/2012    Procedure: COMBINED ENDOSCOPY UPPER, COLONOSCOPY;  EGD, COLONOSCOPY, GERD, HISTORY OF CANCER, polypectomy in colon, biopsies of antrum & esophagus;  Surgeon: Corbin Slaughter MD;  Location: [facility] OR     HC RECONSTR NOSE+SOULEYMANE SEPTAL REPAIR       ZZC OPEN RX ANKLE DISLOCATN              Social History:     Social History     Tobacco Use     Smoking status: Former Smoker     Years: 25.00     Types: Cigarettes     Quit date: 1998     Years since quittin.2     Smokeless tobacco: Never Used   Substance Use Topics     Alcohol use: Yes     Comment: occ            Family History:     Family History   Problem Relation Age of Onset     Thyroid Disease Mother      Cancer Father 74        Lung cancer     Cardiovascular Maternal Grandmother         Pacemaker age 67     Cancer Maternal Grandfather              Allergies:      Allergies   Allergen Reactions     Bees Swelling            Medications:     Current Outpatient Medications   Medication Sig     EPINEPHrine (ANY BX GENERIC EQUIV) 0.3 MG/0.3ML injection 2-pack INJECT 0.3 ML IN THE MUSCLE ONCE FOR 1 DOSE AS DIRECTED     fluticasone (FLONASE) 50 MCG/ACT nasal spray 1-2 sprays each nostril daily     tadalafil (CIALIS) 5 MG tablet TAKE ONE TABLET BY MOUTH EVERY DAY, MAY TAKE AN ADDITIONAL 1-3 TABLETS ONE HOUR PRIOR TO SEXUAL ACTIVITY     No current facility-administered medications for this visit.            Review of Systems:      CONSTITUTIONAL: negative for fever, chills, change in weight  INTEGUMENTARY/SKIN: no rash or obvious new lesions  ENT/MOUTH: no sore throat, new sinus pain or nasal drainage  RESP: see interval history  CV: negative for chest pain, palpitations or peripheral edema  GI: no nausea, vomiting, change in stools  : no dysuria  MUSCULOSKELETAL: no myalgias, arthralgias  ENDOCRINE: blood sugars with adequate control  PSYCHIATRIC: mood stable  LYMPHATIC: no new lymphadenopathy  HEME: no bleeding or easy bruisability  NEURO: no numbness, weakness, headaches         Physical Exam:     Constitutional - looks well, in no apparent distress  Eyes - no redness or discharge  Respiratory -breathing appears comfortable.  No cough.  Skin - No appreciable discoloration or lesions (very limited exam)  Neurological - No apparent tremors. Speech fluent and articlate  Psychiatric - no signs of delirium or anxiety     Exam limited to that easily identified on a virtual visit. The rest of a comprehensive physical examination is deferred due to PHE (public health emergency) video visit restrictions.         Current Laboratory Data:   All laboratory and imaging data reviewed.           Previous Cardiology Imaging   No results found for this or any previous visit (from the past 8760 hour(s)).                 Again, thank you for allowing me to participate in the care of your patient.      Sincerely,    Shahriar Mock MD

## 2022-04-03 ENCOUNTER — MYC MEDICAL ADVICE (OUTPATIENT)
Dept: PULMONOLOGY | Facility: CLINIC | Age: 62
End: 2022-04-03
Payer: COMMERCIAL

## 2022-04-03 DIAGNOSIS — K44.9 HIATAL HERNIA: Primary | ICD-10-CM

## 2022-04-03 DIAGNOSIS — R05.9 COUGH: ICD-10-CM

## 2022-04-04 RX ORDER — LEVOFLOXACIN 500 MG/1
500 TABLET, FILM COATED ORAL DAILY
Qty: 7 TABLET | Refills: 0 | Status: SHIPPED | OUTPATIENT
Start: 2022-04-04 | End: 2022-04-14

## 2022-04-08 ENCOUNTER — MYC MEDICAL ADVICE (OUTPATIENT)
Dept: PULMONOLOGY | Facility: CLINIC | Age: 62
End: 2022-04-08
Payer: COMMERCIAL

## 2022-04-12 NOTE — PROGRESS NOTES
THORACIC SURGERY - NEW PATIENT OFFICE VISIT      Dear Dr. Mock,    I saw Jon Yun in consultation for the evaluation and treatment of GERD and hiatal hernia.    HPI  Mr. Jon Yun is a 62 year old man who presents for evaluation of hiatal hernia. His symptoms are occasional postprandial pain and occasional burping. No significant heartburn or regurgitation. No dysphagia. Normal BMs. Some intentional weight loss.     Of note, also found to have recent 10mm pulmonary nodule for which he will have repeat CT scan in 3 months, is followed by Dr. Mock. Quite anxious about this and the hiatal hernia.    ECOG performance status  0- Fully active, without restriction                 Previsit Tests   CT Chest 2/12/22- large hiatal hernia.       EGD 10/13/2012 (done for heartburn/dyspepsia)- Severe reflux esophagitis, hiatal hernia with a single Mayur ulcer, biopsy showed mild chronic gastritis with intestinal metaplasia, negative for H. Pylori, no evidence of dysplasia or malignancy.     PMH  Pulmonary nodules- 10mm in RLL, intermediate risk, next CT in 3 months  Mild depression- waxes and wanes    Past Medical History:   Diagnosis Date     Chest pressure      Cough 8/13/2009     External hemorrhoids 8/13/2009     Gastritis; acute 8/13/2009     GERD (gastroesophageal reflux disease) 8/13/2009     Hiatal hernia 10/2009     Mild major depression (H) 8/13/2009        PSH    Past Surgical History:   Procedure Laterality Date     COLONOSCOPY       COLONOSCOPY WITH CO2 INSUFFLATION N/A 3/15/2022    Procedure: COLONOSCOPY, WITH CO2 INSUFFLATION;  Surgeon: Raul Keen, DO;  Location: [facility]     ENDOSCOPY UPPER, COLONOSCOPY, COMBINED  10/23/2012    Procedure: COMBINED ENDOSCOPY UPPER, COLONOSCOPY;  EGD, COLONOSCOPY, GERD, HISTORY OF CANCER, polypectomy in colon, biopsies of antrum & esophagus;  Surgeon: Corbin Slaughter MD;  Location: [facility]     HC RECONSTR NOSE+SOULEYMANE SEPTAL REPAIR       ZZC OPEN RX ANKLE  DISLOCATN          Allergies   Allergen Reactions     Bees Swelling       Current Outpatient Medications   Medication     EPINEPHrine (ANY BX GENERIC EQUIV) 0.3 MG/0.3ML injection 2-pack     fluticasone (FLONASE) 50 MCG/ACT nasal spray     levofloxacin (LEVAQUIN) 500 MG tablet     tadalafil (CIALIS) 5 MG tablet     No current facility-administered medications for this visit.       ETOH: occasional.   TOBACCO: 50PY smoker, quit 25 years ago    Physical examination  /78   Pulse 81   Temp 98.4  F (36.9  C) (Oral)   Wt 94.3 kg (208 lb)   SpO2 99%   BMI 29.01 kg/m    Alert and oriented. NAD  Bilateral breath sounds.   Non-cyanotic.    From a personal perspective, works as a  in [employer] He lives with his wife, he has 2 kids and one foster kid.     IMPRESSION   62 year old male patient with GERD, hiatal hernia and Mayur ulcers.     PLAN  I spent 30 min on the date of the encounter in chart review, patient visit, review of tests, documentation and/or discussion with other providers about the issues documented above. I reviewed the plan as follows:  I discussed with Eric his diagnosis and treatment options. I explained that given that he has evidence of Mayur ulcers we should proceed with surgical repair. I will perform an EGD as part of the work up at the beginning of the case. He would like to discuss it with his family before scheduling it. My office will contact him in a few days.    Procedure planned: EGD, Laparoscopic hiatal hernia repair,  no Nissen, no G-tube.  Necessary Preop Tests & Appointments: EGD, then in-person visit and PAC.    I appreciate the opportunity to participate in the care of your patient and will keep you updated.  Sincerely,  Ranjeet Hussein MD

## 2022-04-13 ENCOUNTER — OFFICE VISIT (OUTPATIENT)
Dept: SURGERY | Facility: CLINIC | Age: 62
End: 2022-04-13
Attending: INTERNAL MEDICINE
Payer: COMMERCIAL

## 2022-04-13 VITALS
WEIGHT: 208 LBS | DIASTOLIC BLOOD PRESSURE: 78 MMHG | HEART RATE: 81 BPM | SYSTOLIC BLOOD PRESSURE: 126 MMHG | TEMPERATURE: 98.4 F | BODY MASS INDEX: 29.01 KG/M2 | OXYGEN SATURATION: 99 %

## 2022-04-13 DIAGNOSIS — K25.7 CHRONIC CAMERON ULCER: Primary | ICD-10-CM

## 2022-04-13 DIAGNOSIS — K44.9 HIATAL HERNIA: ICD-10-CM

## 2022-04-13 PROCEDURE — G0463 HOSPITAL OUTPT CLINIC VISIT: HCPCS

## 2022-04-13 PROCEDURE — 99203 OFFICE O/P NEW LOW 30 MIN: CPT | Performed by: THORACIC SURGERY (CARDIOTHORACIC VASCULAR SURGERY)

## 2022-04-13 ASSESSMENT — PAIN SCALES - GENERAL: PAINLEVEL: MILD PAIN (3)

## 2022-04-13 NOTE — NURSING NOTE
"Oncology Rooming Note    April 13, 2022 9:35 AM   Jon Yun is a 62 year old male who presents for:    Chief Complaint   Patient presents with     Oncology Clinic Visit     Hiatal hernia     Initial Vitals: /78   Pulse 81   Temp 98.4  F (36.9  C) (Oral)   Wt 94.3 kg (208 lb)   SpO2 99%   BMI 29.01 kg/m   Estimated body mass index is 29.01 kg/m  as calculated from the following:    Height as of 2/17/22: 1.803 m (5' 11\").    Weight as of this encounter: 94.3 kg (208 lb). Body surface area is 2.17 meters squared.  Mild Pain (3) Comment: Data Unavailable   No LMP for male patient.  Allergies reviewed: Yes  Medications reviewed: Yes    Medications: Medication refills not needed today.  Pharmacy name entered into [employer]: [id number] - [facility] [address] AT [facility]    Clinical concerns: none       Nichole Forte CMA            "

## 2022-04-13 NOTE — LETTER
4/13/2022     RE: Jon Yun  [address] [address]    Dear Colleague,    Thank you for referring your patient, Jon Yun, to the [facility]. Please see a copy of my visit note below.    THORACIC SURGERY - NEW PATIENT OFFICE VISIT      Dear Dr. Mock,    I saw Jon Yun in consultation for the evaluation and treatment of GERD and hiatal hernia.    HPI  Mr. Jon Yun is a 62 year old man who presents for evaluation of hiatal hernia. His symptoms are occasional postprandial pain and occasional burping. No significant heartburn or regurgitation. No dysphagia. Normal BMs. Some intentional weight loss.     Of note, also found to have recent 10mm pulmonary nodule for which he will have repeat CT scan in 3 months, is followed by Dr. Mock. Quite anxious about this and the hiatal hernia.    ECOG performance status  0- Fully active, without restriction                 Previsit Tests   CT Chest 2/12/22- large hiatal hernia.       EGD 10/13/2012 (done for heartburn/dyspepsia)- Severe reflux esophagitis, hiatal hernia with a single Mayur ulcer, biopsy showed mild chronic gastritis with intestinal metaplasia, negative for H. Pylori, no evidence of dysplasia or malignancy.     PMH  Pulmonary nodules- 10mm in RLL, intermediate risk, next CT in 3 months  Mild depression- waxes and wanes    Past Medical History:   Diagnosis Date     Chest pressure      Cough 8/13/2009     External hemorrhoids 8/13/2009     Gastritis; acute 8/13/2009     GERD (gastroesophageal reflux disease) 8/13/2009     Hiatal hernia 10/2009     Mild major depression (H) 8/13/2009        PSH    Past Surgical History:   Procedure Laterality Date     COLONOSCOPY       COLONOSCOPY WITH CO2 INSUFFLATION N/A 3/15/2022    Procedure: COLONOSCOPY, WITH CO2 INSUFFLATION;  Surgeon: Raul Keen, DO;  Location: [facility] OR     ENDOSCOPY UPPER, COLONOSCOPY, COMBINED  10/23/2012    Procedure:  COMBINED ENDOSCOPY UPPER, COLONOSCOPY;  EGD, COLONOSCOPY, GERD, HISTORY OF CANCER, polypectomy in colon, biopsies of antrum & esophagus;  Surgeon: Corbin Slaughter MD;  Location: [facility] OR     HC RECONSTR NOSE+SOULEYMANE SEPTAL REPAIR       ZZC OPEN RX ANKLE DISLOCATN          Allergies   Allergen Reactions     Bees Swelling       Current Outpatient Medications   Medication     EPINEPHrine (ANY BX GENERIC EQUIV) 0.3 MG/0.3ML injection 2-pack     fluticasone (FLONASE) 50 MCG/ACT nasal spray     levofloxacin (LEVAQUIN) 500 MG tablet     tadalafil (CIALIS) 5 MG tablet     No current facility-administered medications for this visit.       ETOH: occasional.   TOBACCO: 50PY smoker, quit 25 years ago    Physical examination  /78   Pulse 81   Temp 98.4  F (36.9  C) (Oral)   Wt 94.3 kg (208 lb)   SpO2 99%   BMI 29.01 kg/m    Alert and oriented. NAD  Bilateral breath sounds.   Non-cyanotic.    From a personal perspective, works as a  in [employer] He lives with his wife, he has 2 kids and one foster kid.     IMPRESSION   62 year old male patient with GERD, hiatal hernia and Mayur ulcers.     PLAN  I spent 30 min on the date of the encounter in chart review, patient visit, review of tests, documentation and/or discussion with other providers about the issues documented above. I reviewed the plan as follows:  I discussed with Eric his diagnosis and treatment options. I explained that given that he has evidence of Mayur ulcers we should proceed with surgical repair. I will perform an EGD as part of the work up at the beginning of the case. He would like to discuss it with his family before scheduling it. My office will contact him in a few days.    Procedure planned: EGD, Laparoscopic hiatal hernia repair,  no Nissen, no G-tube.  Necessary Preop Tests & Appointments: EGD, then in-person visit and PAC.    I appreciate the opportunity to participate in the care of your patient and will keep you  updated.  Sincerely,  [place]

## 2022-04-14 ENCOUNTER — PATIENT OUTREACH (OUTPATIENT)
Dept: PULMONOLOGY | Facility: CLINIC | Age: 62
End: 2022-04-14
Payer: COMMERCIAL

## 2022-04-14 DIAGNOSIS — R05.9 COUGH: ICD-10-CM

## 2022-04-14 RX ORDER — LEVOFLOXACIN 500 MG/1
500 TABLET, FILM COATED ORAL DAILY
Qty: 7 TABLET | Refills: 0 | Status: SHIPPED | OUTPATIENT
Start: 2022-04-14 | End: 2022-06-28

## 2022-04-14 NOTE — PROGRESS NOTES
Received fax request for refill of levofloxacin.  Patient was on a 7 day course with no refills.    Called the pharmacy and advised them we will not refill at this time.

## 2022-04-14 NOTE — TELEPHONE ENCOUNTER
"Nurse Triage SBAR    Situation: Needs Levaquin refilled. Other questions.    Background:  Sees Dr. Mock    Assessment:   Pt's wife, Neisha, has the following questions:  1) Pt took med in lunchbox at work and the pills got wet. Had not started the course of antibiotic. Tried to get the med refilled at pharmacy, but were instructed to call provider.  Pended up and will be routed to Dr. Mock. Prescription was given to pt because he was having some mucus and coughing up some blood. Dr. Mock thought this sounded like bronchitis and ordered the Levaquin.    2) Pt is very anxious. It affects his sleep. Can't go back to sleep after he wakes up. Often has questions and forgets to ask them. Encouraged Neisha and Eric to write down their questions so they don't forget to ask them. Neisha states that she was with pt on the last zoom call, and he was like a \"deer in the headlights.\"    3) Upcoming apt is 6/28. Neisha is wondering if Eric should be seen sooner since he is so anxious. Has no anti anxiety medication ordered. Will check with Yesenia Pastor to see if she would be willing to call pt and check in with him and see if he has any questions or concerns at this time.    Recommendation:   Routed to Dr. Mock and Yesenia Pastor, RNCC    Yesenia will call pt tomorrow to discuss questions/concerns.    "

## 2022-04-15 NOTE — TELEPHONE ENCOUNTER
Called to discuss patient's questions.  No answer.  LVM for patient to return call to further discuss.

## 2022-04-22 ENCOUNTER — PATIENT OUTREACH (OUTPATIENT)
Dept: SURGERY | Facility: CLINIC | Age: 62
End: 2022-04-22
Payer: COMMERCIAL

## 2022-04-22 NOTE — PROGRESS NOTES
[facility]/Voicemail    Clinical Data: Care Coordinator Outreach    Called patient to follow-up on discussion with Dr Garay about possible surgery. Outreach attempted x 1.  Left message on patient's voicemail with call back information and requested return call.    Plan: Care Coordinator will try to reach patient again in 3-5 business days.    Bianca Salinas RN, BSN  Thoracic Surgery RN Care Coordinator

## 2022-05-11 ENCOUNTER — OFFICE VISIT (OUTPATIENT)
Dept: FAMILY MEDICINE | Facility: CLINIC | Age: 62
End: 2022-05-11
Payer: COMMERCIAL

## 2022-05-11 VITALS
BODY MASS INDEX: 29.01 KG/M2 | HEART RATE: 67 BPM | TEMPERATURE: 97.6 F | WEIGHT: 208 LBS | SYSTOLIC BLOOD PRESSURE: 125 MMHG | DIASTOLIC BLOOD PRESSURE: 81 MMHG

## 2022-05-11 DIAGNOSIS — R91.8 PULMONARY NODULES: ICD-10-CM

## 2022-05-11 DIAGNOSIS — R53.83 FATIGUE, UNSPECIFIED TYPE: ICD-10-CM

## 2022-05-11 DIAGNOSIS — Z12.5 SCREENING FOR PROSTATE CANCER: ICD-10-CM

## 2022-05-11 DIAGNOSIS — K44.9 HIATAL HERNIA: ICD-10-CM

## 2022-05-11 DIAGNOSIS — R10.84 ABDOMINAL PAIN, GENERALIZED: Primary | ICD-10-CM

## 2022-05-11 DIAGNOSIS — K21.9 GASTROESOPHAGEAL REFLUX DISEASE, UNSPECIFIED WHETHER ESOPHAGITIS PRESENT: ICD-10-CM

## 2022-05-11 DIAGNOSIS — R91.8 LUNG NODULES: ICD-10-CM

## 2022-05-11 DIAGNOSIS — R63.4 WEIGHT LOSS: ICD-10-CM

## 2022-05-11 DIAGNOSIS — R35.0 URINARY FREQUENCY: ICD-10-CM

## 2022-05-11 LAB
ALBUMIN SERPL-MCNC: 3.9 G/DL (ref 3.4–5)
ALBUMIN UR-MCNC: NEGATIVE MG/DL
ALP SERPL-CCNC: 58 U/L (ref 40–150)
ALT SERPL W P-5'-P-CCNC: 30 U/L (ref 0–70)
ANION GAP SERPL CALCULATED.3IONS-SCNC: 6 MMOL/L (ref 3–14)
APPEARANCE UR: CLEAR
AST SERPL W P-5'-P-CCNC: 16 U/L (ref 0–45)
BASOPHILS # BLD AUTO: 0.1 10E3/UL (ref 0–0.2)
BASOPHILS NFR BLD AUTO: 1 %
BILIRUB SERPL-MCNC: 0.5 MG/DL (ref 0.2–1.3)
BILIRUB UR QL STRIP: NEGATIVE
BUN SERPL-MCNC: 13 MG/DL (ref 7–30)
CALCIUM SERPL-MCNC: 9.4 MG/DL (ref 8.5–10.1)
CHLORIDE BLD-SCNC: 111 MMOL/L (ref 94–109)
CO2 SERPL-SCNC: 25 MMOL/L (ref 20–32)
COLOR UR AUTO: YELLOW
CREAT SERPL-MCNC: 0.64 MG/DL (ref 0.66–1.25)
EOSINOPHIL # BLD AUTO: 0.5 10E3/UL (ref 0–0.7)
EOSINOPHIL NFR BLD AUTO: 8 %
ERYTHROCYTE [DISTWIDTH] IN BLOOD BY AUTOMATED COUNT: 15.3 % (ref 10–15)
GFR SERPL CREATININE-BSD FRML MDRD: >90 ML/MIN/1.73M2
GLUCOSE BLD-MCNC: 105 MG/DL (ref 70–99)
GLUCOSE UR STRIP-MCNC: NEGATIVE MG/DL
HCT VFR BLD AUTO: 44.1 % (ref 40–53)
HGB BLD-MCNC: 14.7 G/DL (ref 13.3–17.7)
HGB UR QL STRIP: NEGATIVE
KETONES UR STRIP-MCNC: NEGATIVE MG/DL
LEUKOCYTE ESTERASE UR QL STRIP: NEGATIVE
LYMPHOCYTES # BLD AUTO: 1.5 10E3/UL (ref 0.8–5.3)
LYMPHOCYTES NFR BLD AUTO: 25 %
MCH RBC QN AUTO: 30.9 PG (ref 26.5–33)
MCHC RBC AUTO-ENTMCNC: 33.3 G/DL (ref 31.5–36.5)
MCV RBC AUTO: 93 FL (ref 78–100)
MONOCYTES # BLD AUTO: 0.5 10E3/UL (ref 0–1.3)
MONOCYTES NFR BLD AUTO: 8 %
NEUTROPHILS # BLD AUTO: 3.3 10E3/UL (ref 1.6–8.3)
NEUTROPHILS NFR BLD AUTO: 58 %
NITRATE UR QL: NEGATIVE
PH UR STRIP: 7 [PH] (ref 5–7)
PLATELET # BLD AUTO: 212 10E3/UL (ref 150–450)
POTASSIUM BLD-SCNC: 4.2 MMOL/L (ref 3.4–5.3)
PROT SERPL-MCNC: 7.1 G/DL (ref 6.8–8.8)
PSA SERPL-MCNC: 0.91 UG/L (ref 0–4)
RBC # BLD AUTO: 4.76 10E6/UL (ref 4.4–5.9)
RBC #/AREA URNS AUTO: ABNORMAL /HPF
SODIUM SERPL-SCNC: 142 MMOL/L (ref 133–144)
SP GR UR STRIP: <=1.005 (ref 1–1.03)
SQUAMOUS #/AREA URNS AUTO: ABNORMAL /LPF
TSH SERPL DL<=0.005 MIU/L-ACNC: 0.92 MU/L (ref 0.4–4)
UROBILINOGEN UR STRIP-ACNC: 0.2 E.U./DL
WBC # BLD AUTO: 5.8 10E3/UL (ref 4–11)
WBC #/AREA URNS AUTO: ABNORMAL /HPF

## 2022-05-11 PROCEDURE — 99214 OFFICE O/P EST MOD 30 MIN: CPT | Performed by: FAMILY MEDICINE

## 2022-05-11 PROCEDURE — G0103 PSA SCREENING: HCPCS | Performed by: FAMILY MEDICINE

## 2022-05-11 PROCEDURE — 80050 GENERAL HEALTH PANEL: CPT | Performed by: FAMILY MEDICINE

## 2022-05-11 PROCEDURE — 81001 URINALYSIS AUTO W/SCOPE: CPT | Performed by: FAMILY MEDICINE

## 2022-05-11 PROCEDURE — 36415 COLL VENOUS BLD VENIPUNCTURE: CPT | Performed by: FAMILY MEDICINE

## 2022-05-11 ASSESSMENT — PATIENT HEALTH QUESTIONNAIRE - PHQ9
SUM OF ALL RESPONSES TO PHQ QUESTIONS 1-9: 9
10. IF YOU CHECKED OFF ANY PROBLEMS, HOW DIFFICULT HAVE THESE PROBLEMS MADE IT FOR YOU TO DO YOUR WORK, TAKE CARE OF THINGS AT HOME, OR GET ALONG WITH OTHER PEOPLE: SOMEWHAT DIFFICULT
SUM OF ALL RESPONSES TO PHQ QUESTIONS 1-9: 9

## 2022-05-11 ASSESSMENT — ANXIETY QUESTIONNAIRES
3. WORRYING TOO MUCH ABOUT DIFFERENT THINGS: MORE THAN HALF THE DAYS
2. NOT BEING ABLE TO STOP OR CONTROL WORRYING: MORE THAN HALF THE DAYS
5. BEING SO RESTLESS THAT IT IS HARD TO SIT STILL: SEVERAL DAYS
GAD7 TOTAL SCORE: 14
GAD7 TOTAL SCORE: 14
8. IF YOU CHECKED OFF ANY PROBLEMS, HOW DIFFICULT HAVE THESE MADE IT FOR YOU TO DO YOUR WORK, TAKE CARE OF THINGS AT HOME, OR GET ALONG WITH OTHER PEOPLE?: SOMEWHAT DIFFICULT
1. FEELING NERVOUS, ANXIOUS, OR ON EDGE: NEARLY EVERY DAY
7. FEELING AFRAID AS IF SOMETHING AWFUL MIGHT HAPPEN: NEARLY EVERY DAY
4. TROUBLE RELAXING: SEVERAL DAYS
6. BECOMING EASILY ANNOYED OR IRRITABLE: MORE THAN HALF THE DAYS
GAD7 TOTAL SCORE: 14
7. FEELING AFRAID AS IF SOMETHING AWFUL MIGHT HAPPEN: NEARLY EVERY DAY

## 2022-05-11 NOTE — PROGRESS NOTES
Antonio Reyes is a 62 year old who presents for the following health issues     History of Present Illness       Back Pain:  He presents for follow up of back pain. Patient's back pain is a recurring problem.  Location of back pain:  Other  Description of back pain: dull ache and other  Back pain spreads: nowhere    Since patient first noticed back pain, pain is: always present, but gets better and worse  Does back pain interfere with his job:  Yes      Mental Health Follow-up:  Patient presents to follow-up on Depression & Anxiety.Patient's depression since last visit has been:  Medium  The patient is not having other symptoms associated with depression.  Patient's anxiety since last visit has been:  Medium  The patient is not having other symptoms associated with anxiety.  Any significant life events: grief or loss and health concerns  Patient is feeling anxious or having panic attacks.  Patient has no concerns about alcohol or drug use.       Today's PHQ-9         PHQ-9 Total Score: 9  PHQ-9 Q9 Thoughts of better off dead/self-harm past 2 weeks :   (P) Not at all    How difficult have these problems made it for you to do your work, take care of things at home, or get along with other people: Somewhat difficult    Today's MENDY-7 Score: 14    Reason for visit:  Concern about stomach and lungs hiatal hernia swollen glands neck pain anxiety  Symptom onset:  More than a month  Symptoms include:  Various  Symptom intensity:  Moderate  Symptom progression:  Staying the same  Had these symptoms before:  Yes  Has tried/received treatment for these symptoms:  No  What makes it worse:  Not sure  What makes it better:  Not surr    He eats 2-3 servings of fruits and vegetables daily.He consumes 0 sweetened beverage(s) daily.He exercises with enough effort to increase his heart rate 30 to 60 minutes per day.  He exercises with enough effort to increase his heart rate 5 days per week.   He is taking medications  regularly.        Review of Systems   Mid back    Lung nodules more concerning    covid late Ignacio this year  No hospitalization but was quite sick    Currently on levaquin    Intentional wt loss, 25 lb            Objective    /81 (BP Location: Left arm, Patient Position: Chair, Cuff Size: Adult Regular)   Pulse 67   Temp 97.6  F (36.4  C) (Temporal)   Wt 94.3 kg (208 lb)   BMI 29.01 kg/m    Body mass index is 29.01 kg/m .  Physical Exam  Constitutional:       Appearance: He is well-developed.   HENT:      Head: Normocephalic and atraumatic.   Eyes:      Conjunctiva/sclera: Conjunctivae normal.   Neck:      Vascular: No carotid bruit.   Cardiovascular:      Rate and Rhythm: Normal rate and regular rhythm.      Heart sounds: Normal heart sounds.   Pulmonary:      Effort: Pulmonary effort is normal.      Breath sounds: Normal breath sounds.   Abdominal:      Palpations: Abdomen is soft.      Tenderness: There is abdominal tenderness (mild discomfort in epigastric and central abd).   Neurological:      Mental Status: He is alert and oriented to person, place, and time.      Cranial Nerves: No cranial nerve deficit.   Psychiatric:         Speech: Speech normal.         Behavior: Behavior normal.           ASSESSMENT / PLAN:  (R10.84) Abdominal pain, generalized  (primary encounter diagnosis)  Comment: discussed multiple issues with patient.  He had lung imaging recently but prudent to add abd imaging also given pain and wt loss.   Plan: CT Abdomen Pelvis w Contrast             (R63.4) Weight loss  Comment: as above   Plan: CT Abdomen Pelvis w Contrast        Patient to call and schedule    (R91.8) Lung nodules  Comment: patient is to have follow up CT lungs in June . This is per  Guidelines   Plan: as above.     (K44.9) Hiatal hernia  Comment: advise egd and starting ppi.  Patient to call and schedule egd.    Plan: omeprazole (PRILOSEC) 20 MG DR capsule, Adult         Gastro Ref - Procedure Only              (K21.9) Gastroesophageal reflux disease, unspecified whether esophagitis present  Comment: as above   Plan: Adult Gastro Ref - Procedure Only             (R53.83) Fatigue, unspecified type  Comment:  Check labs   Plan: CBC with Platelets & Differential,         Comprehensive metabolic panel, TSH with free T4        reflex             (R35.0) Urinary frequency  Comment: check ua   Plan: UA with Microscopic reflex to Culture - lab         collect, Urine Microscopic             (Z12.5) Screening for prostate cancer  Comment: psa   Plan: Prostate Specific Antigen Screen             (R91.8) Pulmonary nodules  Comment: as above   Plan: as above       I reviewed the patient's medications, allergies, medical history, family history, and social history.    Bacilio Mckoy MD

## 2022-05-11 NOTE — PATIENT INSTRUCTIONS
Schedule upper endoscopy    Start omeprazole    Finish out antibiotics    Schedule CT abdomen/ pelvis    We will send you lab results

## 2022-05-12 ENCOUNTER — TELEPHONE (OUTPATIENT)
Dept: GASTROENTEROLOGY | Facility: CLINIC | Age: 62
End: 2022-05-12
Payer: COMMERCIAL

## 2022-05-12 DIAGNOSIS — Z11.59 ENCOUNTER FOR SCREENING FOR OTHER VIRAL DISEASES: Primary | ICD-10-CM

## 2022-05-12 ASSESSMENT — PATIENT HEALTH QUESTIONNAIRE - PHQ9: SUM OF ALL RESPONSES TO PHQ QUESTIONS 1-9: 9

## 2022-05-12 ASSESSMENT — ANXIETY QUESTIONNAIRES: GAD7 TOTAL SCORE: 14

## 2022-05-12 NOTE — TELEPHONE ENCOUNTER
Screening Questions  BlueKIND OF PREP RedLOCATION [review exclusion criteria] GreenSEDATION TYPE  1. Have you had a positive covid test in the last 90 days? N     2. Do you have a legal guardian or medical Power of ?  Are you able to give consent for your medical care? N  (Sedation review/consideration needed)    3. Are you active on mychart? N    4. What insurance is in the chart? NACL     3.   Ordering/Referring Provider: DB VALENZUELA MD    4. BMI 29.01  [BMI OVER 40-EXTENDED PREP]  If greater than 40 review exclusion criteria [PAC APPT IF @ [facility]]      5.  Respiratory Screening :  [If yes to any of the following HOSPITAL setting only]     Do you use daily home oxygen? N    Do you have mod to severe Obstructive Sleep Apnea? N  [OKAY @ [facility] SH PH RI]   Do you have Pulmonary Hypertension? N     Do you have UNCONTROLLED asthma? N      6.   Have you had a heart or lung transplant? N      7.   Are you currently on dialysis? N [ If yes, G-PREP & HOSPITAL setting only]     8.   Do you have chronic kidney disease? N  [ If yes, G-PREP ]    9.   Have you had a stroke or Transient ischemic attack (TIA - aka  mini stroke ) within 6 months?  N (If yes, please review exclusion criteria)    10.   In the past 6 months, have you had any heart related issues including cardiomyopathy or heart attack? N           If yes, did it require cardiac stenting or other implantable device? N      11.   Do you have any implantable devices in your body (pacemaker, defib, LVAD)? N  (If yes, please review exclusion criteria)    12.   Do you take nitroglycerin? N           If yes, how often? N  (if yes, HOSPITAL setting ONLY)    13.   Are you currently taking any blood thinners? N           [IF YES, INFORM PATIENT TO FOLLOW UP W/ ORDERING PROVIDER FOR BRIDGING INSTRUCTIONS]     14.   Do you have a diagnosis of diabetes? N   [ If yes, G-PREP ]    15.   [FEMALES] Are you currently pregnant? N    If yes, how many weeks? N    16.    Are you taking any prescription pain medications on a routine schedule?  N    [ If yes, EXTENDED PREP.] [If yes, MAC]    17.   Do you have any chemical dependencies such as alcohol, street drugs, or methadone?  N   [If yes, MAC]    18.   Do you have any history of post-traumatic stress syndrome, severe anxiety or history of psychosis?  N   [If yes, MAC]    19.   Do you transfer independently?  Y    20.  On a regular basis do you go 3-5 days between bowel movements?    [ If yes, EXTENDED PREP.]    21.   Preferred LOCAL Pharmacy for Pre Prescription  [facility]    Scheduling Details      Caller : JENY, WIFE  [phone]    (Please ask for phone number if not scheduled by patient)    Type of Procedure Scheduled: EGD  Which Colonoscopy Prep was Sent?:   SUDHIR CF PATIENTS & GROELIS'S PATIENTS NEEDS EXTENDED PREP  Surgeon: MELODY  Date of Procedure: 5/26  Location:     Sedation Type: CS  Conscious Sedation- Needs  for 6 hours after the procedure  MAC/General-Needs  for 24 hours after procedure    Pre-op Required at [facility], [place], [place] and OR for MAC sedation: N  (advise patient they will need a pre-op prior to procedure -)      Informed patient they will need an adult  Y  Cannot take any type of public or medical transportation alone    Pre-Procedure Cov[facility] or Externally: Y @ [place]    Confirmed Nurse will call to complete assessment Y    Additional comments:

## 2022-05-12 NOTE — RESULT ENCOUNTER NOTE
These labs are all fine.    Don't worry about the creatinine being low.  This kidney test is only worrisome if high.    Bacilio Mckoy MD

## 2022-05-13 ENCOUNTER — IMMUNIZATION (OUTPATIENT)
Dept: NURSING | Facility: CLINIC | Age: 62
End: 2022-05-13
Payer: COMMERCIAL

## 2022-05-13 PROCEDURE — 91306 COVID-19,PF,MODERNA (18+ YRS BOOSTER .25ML): CPT

## 2022-05-13 PROCEDURE — 0064A COVID-19,PF,MODERNA (18+ YRS BOOSTER .25ML): CPT

## 2022-05-16 ENCOUNTER — TELEPHONE (OUTPATIENT)
Dept: FAMILY MEDICINE | Facility: CLINIC | Age: 62
End: 2022-05-16
Payer: COMMERCIAL

## 2022-05-16 NOTE — LETTER
[facility]  [address]  [place] [address]  [phone]            May 16, 2022    To whom it may concern:    Jon cage 64 was seen here at the clinic 22.              Sincerely,                  Bacilio Mckoy MD

## 2022-05-23 ENCOUNTER — LAB (OUTPATIENT)
Dept: URGENT CARE | Facility: URGENT CARE | Age: 62
End: 2022-05-23
Attending: INTERNAL MEDICINE
Payer: COMMERCIAL

## 2022-05-23 DIAGNOSIS — Z11.59 ENCOUNTER FOR SCREENING FOR OTHER VIRAL DISEASES: ICD-10-CM

## 2022-05-23 PROCEDURE — U0005 INFEC AGEN DETEC AMPLI PROBE: HCPCS

## 2022-05-23 PROCEDURE — U0003 INFECTIOUS AGENT DETECTION BY NUCLEIC ACID (DNA OR RNA); SEVERE ACUTE RESPIRATORY SYNDROME CORONAVIRUS 2 (SARS-COV-2) (CORONAVIRUS DISEASE [COVID-19]), AMPLIFIED PROBE TECHNIQUE, MAKING USE OF HIGH THROUGHPUT TECHNOLOGIES AS DESCRIBED BY CMS-2020-01-R: HCPCS

## 2022-05-24 LAB — SARS-COV-2 RNA RESP QL NAA+PROBE: NEGATIVE

## 2022-05-26 ENCOUNTER — HOSPITAL ENCOUNTER (OUTPATIENT)
Facility: AMBULATORY SURGERY CENTER | Age: 62
Discharge: HOME | End: 2022-05-26
Attending: INTERNAL MEDICINE | Admitting: INTERNAL MEDICINE
Payer: COMMERCIAL

## 2022-05-26 VITALS
DIASTOLIC BLOOD PRESSURE: 74 MMHG | OXYGEN SATURATION: 94 % | SYSTOLIC BLOOD PRESSURE: 103 MMHG | RESPIRATION RATE: 16 BRPM | HEART RATE: 76 BPM | TEMPERATURE: 97.8 F

## 2022-05-26 LAB — UPPER GI ENDOSCOPY: NORMAL

## 2022-05-26 PROCEDURE — G8907 PT DOC NO EVENTS ON DISCHARG: HCPCS

## 2022-05-26 PROCEDURE — G8918 PT W/O PREOP ORDER IV AB PRO: HCPCS

## 2022-05-26 PROCEDURE — 43235 EGD DIAGNOSTIC BRUSH WASH: CPT

## 2022-05-26 RX ORDER — NALOXONE HYDROCHLORIDE 0.4 MG/ML
0.4 INJECTION, SOLUTION INTRAMUSCULAR; INTRAVENOUS; SUBCUTANEOUS
Status: CANCELLED | OUTPATIENT
Start: 2022-05-26

## 2022-05-26 RX ORDER — ONDANSETRON 2 MG/ML
4 INJECTION INTRAMUSCULAR; INTRAVENOUS
Status: DISCONTINUED | OUTPATIENT
Start: 2022-05-26 | End: 2022-05-27 | Stop reason: HOSPADM

## 2022-05-26 RX ORDER — ONDANSETRON 2 MG/ML
4 INJECTION INTRAMUSCULAR; INTRAVENOUS EVERY 6 HOURS PRN
Status: CANCELLED | OUTPATIENT
Start: 2022-05-26

## 2022-05-26 RX ORDER — FENTANYL CITRATE 50 UG/ML
INJECTION, SOLUTION INTRAMUSCULAR; INTRAVENOUS PRN
Status: DISCONTINUED | OUTPATIENT
Start: 2022-05-26 | End: 2022-05-26 | Stop reason: HOSPADM

## 2022-05-26 RX ORDER — NALOXONE HYDROCHLORIDE 0.4 MG/ML
0.2 INJECTION, SOLUTION INTRAMUSCULAR; INTRAVENOUS; SUBCUTANEOUS
Status: CANCELLED | OUTPATIENT
Start: 2022-05-26

## 2022-05-26 RX ORDER — ONDANSETRON 4 MG/1
4 TABLET, ORALLY DISINTEGRATING ORAL EVERY 6 HOURS PRN
Status: CANCELLED | OUTPATIENT
Start: 2022-05-26

## 2022-05-26 RX ORDER — FLUMAZENIL 0.1 MG/ML
0.2 INJECTION, SOLUTION INTRAVENOUS
Status: CANCELLED | OUTPATIENT
Start: 2022-05-26 | End: 2022-05-26

## 2022-05-26 RX ORDER — ONDANSETRON 4 MG/1
4 TABLET, ORALLY DISINTEGRATING ORAL EVERY 6 HOURS PRN
Status: DISCONTINUED | OUTPATIENT
Start: 2022-05-26 | End: 2022-05-27 | Stop reason: HOSPADM

## 2022-05-26 RX ORDER — PROCHLORPERAZINE MALEATE 10 MG
10 TABLET ORAL EVERY 6 HOURS PRN
Status: CANCELLED | OUTPATIENT
Start: 2022-05-26

## 2022-05-26 RX ORDER — LIDOCAINE 40 MG/G
CREAM TOPICAL
Status: DISCONTINUED | OUTPATIENT
Start: 2022-05-26 | End: 2022-05-27 | Stop reason: HOSPADM

## 2022-05-26 RX ADMIN — ONDANSETRON 4 MG: 4 TABLET, ORALLY DISINTEGRATING ORAL at 11:50

## 2022-05-26 NOTE — H&P
[facility] Anesthesia Pre-op History and Physical    Jon Yun MRN# [id number]   Age: 62 year old YOB: 1960            Date of Exam 5/26/2022         Primary care provider: Bacilio Mckoy         Chief Complaint and/or Reason for Procedure:     Reflux, hiatal hernia         Active problem list:     Patient Active Problem List    Diagnosis Date Noted     Pulmonary nodules 02/22/2022     Priority: Medium     Referred to [facility] by [facility] [place] Results Team.       History of depression 01/08/2016     Priority: Medium     CARDIOVASCULAR SCREENING; LDL GOAL LESS THAN 130 09/27/2012     Priority: Medium     Chest pressure 10/06/2009     Priority: Medium     Gastric ulcer 10/06/2009     Priority: Medium     Hiatal hernia 10/01/2009     Priority: Medium     Gastritis, acute 08/13/2009     Priority: Medium     (Problem list name updated by automated process. Provider to review and confirm.)       External hemorrhoids 08/13/2009     Priority: Medium     Cough 08/13/2009     Priority: Medium            Medications (include herbals and vitamins):   Any Plavix use in the last 7 days? No     Current Outpatient Medications   Medication Sig     fluticasone (FLONASE) 50 MCG/ACT nasal spray 1-2 sprays each nostril daily     omeprazole (PRILOSEC) 20 MG DR capsule Take 1 capsule (20 mg) by mouth daily     EPINEPHrine (ANY BX GENERIC EQUIV) 0.3 MG/0.3ML injection 2-pack INJECT 0.3 ML IN THE MUSCLE ONCE FOR 1 DOSE AS DIRECTED     levofloxacin (LEVAQUIN) 500 MG tablet Take 1 tablet (500 mg) by mouth daily     tadalafil (CIALIS) 5 MG tablet TAKE ONE TABLET BY MOUTH EVERY DAY, MAY TAKE AN ADDITIONAL 1-3 TABLETS ONE HOUR PRIOR TO SEXUAL ACTIVITY     No current facility-administered medications for this encounter.             Allergies:      Allergies   Allergen Reactions     Bees Swelling     Allergy to Latex? No  Allergy to tape?   No  Intolerances:             Physical Exam:   All  vitals have been reviewed  Patient Vitals for the past 8 hrs:   BP Temp Temp src Pulse Resp SpO2   05/26/22 1000 106/59 97.7  F (36.5  C) Temporal 66 12 96 %     No intake/output data recorded.  Lungs:   No increased work of breathing, good air exchange, clear to auscultation bilaterally, no crackles or wheezing     Cardiovascular:   normal S1 and S2             Lab / Radiology Results:            Anesthetic risk and/or ASA classification:   2    Radha Pierre DO

## 2022-06-27 NOTE — PROGRESS NOTES
LUNG NODULE & INTERVENTIONAL PULMONARY CLINIC  [facility]     Jon Yun MRN# [id number]   Age: 62 year old YOB: 1960     Reason for Consultation: lung nodule(s)    Requesting Physician: Bacilio Mckoy MD  [address] [place] [address]       Assessment and Plan:    1. New multiple pulmonary lung nodule(s). Given the characteristics on current/previous imaging and risk factors; I would classify this to be Intermediate (6-65%) risk for cancer. Culprit nodule is 10mm in RLL. Plan to follow Fleischner Criteria. Next CT in 6mo.       Billing: I spent 30-39min (Established, 91096) on the date of the encounter doing chart review, history and exam, documentation and further activities as described in this note.    Shahriar Mock MD  Associate Professor of Medicine  Section of Interventional Pulmonology   Division of Pulmonary, Allergy, Critical Care and Sleep Medicine   [facility]  Pager: [phone]   Office: [phone]  Email: [email or web address]    Yesenia ARREOLA, RN  Interventional Pulmonary Care Coordinator  Office: [phone]  Email: [email or web address]    Rachel Taylor  Interventional Pulmonology Surgery Scheduler  Office: [phone]  Email: [email or web address]         History:     Jon Yun is a 61 year old male with sig h/o for GERD, MDD who is here for evaluation/followup of lung nodule(s).     - No new resp sx or complaints. He does c/o dyspnea, abdominal pressure.   - here for evaluation of nodules. Last seen by me 3/22/22  - Personal hx of cancer: no   - Family hx of cancer: dad had lung cancer   - Exposure hx: Denies asbestos or radon exposure   - Tobacco hx: Past Smoker: 2ppd since 25yrs. Quit 25yrs ago.   - My interpretation of the images relevant for this visit includes: nodules   - My interpretation of the PFT's relevant for this visit includes: None      Culprit  Nodule(s): -needs updated ct  1. Multiple bilateral lung nodule(s) that are sub 10 mm. First seen by chest CT on 22. First 2. observed on this date   3. Largest nodules is RLL 10mm nodules.   4. Large Hiatal hernia     Other active medical problems include:   - has GERD. stable   - has MDD. Stable            Past Medical History:      Past Medical History:   Diagnosis Date     Chest pressure      Cough 2009     External hemorrhoids 2009     Gastritis; acute 2009     GERD (gastroesophageal reflux disease) 2009     Hiatal hernia 10/2009     Mild major depression (H) 2009           Past Surgical History:      Past Surgical History:   Procedure Laterality Date     COLONOSCOPY       COLONOSCOPY WITH CO2 INSUFFLATION N/A 3/15/2022    Procedure: COLONOSCOPY, WITH CO2 INSUFFLATION;  Surgeon: Raul Keen DO;  Location: [facility] OR     COMBINED ESOPHAGOSCOPY, GASTROSCOPY, DUODENOSCOPY (EGD) WITH CO2 INSUFFLATION N/A 2022    Procedure: ESOPHAGOGASTRODUODENOSCOPY, WITH CO2 INSUFFLATION;  Surgeon: Radha Pierre DO;  Location: [facility] OR     ENDOSCOPY UPPER, COLONOSCOPY, COMBINED  10/23/2012    Procedure: COMBINED ENDOSCOPY UPPER, COLONOSCOPY;  EGD, COLONOSCOPY, GERD, HISTORY OF CANCER, polypectomy in colon, biopsies of antrum & esophagus;  Surgeon: Corbin Slaughter MD;  Location: [facility] OR     HC RECONSTR NOSE+SOULEYMANE SEPTAL REPAIR       ZZC OPEN RX ANKLE DISLOCATN            Social History:     Social History     Tobacco Use     Smoking status: Former Smoker     Years: 25.00     Types: Cigarettes     Quit date: 1998     Years since quittin.5     Smokeless tobacco: Never Used   Substance Use Topics     Alcohol use: Yes     Comment: occ          Family History:     Family History   Problem Relation Age of Onset     Thyroid Disease Mother      Cancer Father 74        Lung cancer     Cardiovascular Maternal Grandmother         Pacemaker age 67     Cancer Maternal Grandfather             Allergies:      Allergies   Allergen Reactions     Bees Swelling          Medications:     Current Outpatient Medications   Medication Sig     EPINEPHrine (ANY BX GENERIC EQUIV) 0.3 MG/0.3ML injection 2-pack INJECT 0.3 ML IN THE MUSCLE ONCE FOR 1 DOSE AS DIRECTED     fluticasone (FLONASE) 50 MCG/ACT nasal spray 1-2 sprays each nostril daily     levofloxacin (LEVAQUIN) 500 MG tablet Take 1 tablet (500 mg) by mouth daily     omeprazole (PRILOSEC) 20 MG DR capsule Take 1 capsule (20 mg) by mouth daily     tadalafil (CIALIS) 5 MG tablet TAKE ONE TABLET BY MOUTH EVERY DAY, MAY TAKE AN ADDITIONAL 1-3 TABLETS ONE HOUR PRIOR TO SEXUAL ACTIVITY     No current facility-administered medications for this visit.          Review of Systems:     CONSTITUTIONAL: negative for fever, chills, change in weight  INTEGUMENTARY/SKIN: no rash or obvious new lesions  ENT/MOUTH: no sore throat, new sinus pain or nasal drainage  RESP: see interval history  CV: negative for chest pain, palpitations or peripheral edema  GI: no nausea, vomiting, change in stools  : no dysuria  MUSCULOSKELETAL: no myalgias, arthralgias  ENDOCRINE: blood sugars with adequate control  PSYCHIATRIC: mood stable  LYMPHATIC: no new lymphadenopathy  HEME: no bleeding or easy bruisability  NEURO: no numbness, weakness, headaches         Physical Exam:     Constitutional - looks well, in no apparent distress  Respiratory -breathing appears comfortable.   Skin - No appreciable discoloration or lesions (very limited exam)  Neurological - No apparent tremors. Speech fluent and articlate          Current Laboratory Data:   All laboratory and imaging data reviewed.    No results found for this or any previous visit (from the past 24 hour(s)).         Previous Chest Imaging   No images are attached to the encounter.  No images are attached to the encounter or orders placed in the encounter.         Previous Cardiology Imaging   No results found for this or any previous visit  (from the past 8760 hour(s)).

## 2022-06-28 ENCOUNTER — ANCILLARY PROCEDURE (OUTPATIENT)
Dept: CT IMAGING | Facility: CLINIC | Age: 62
End: 2022-06-28
Attending: INTERNAL MEDICINE
Payer: COMMERCIAL

## 2022-06-28 ENCOUNTER — OFFICE VISIT (OUTPATIENT)
Dept: PULMONOLOGY | Facility: CLINIC | Age: 62
End: 2022-06-28
Attending: INTERNAL MEDICINE
Payer: COMMERCIAL

## 2022-06-28 VITALS
SYSTOLIC BLOOD PRESSURE: 119 MMHG | BODY MASS INDEX: 29.76 KG/M2 | DIASTOLIC BLOOD PRESSURE: 76 MMHG | HEART RATE: 73 BPM | TEMPERATURE: 98 F | WEIGHT: 213.4 LBS | OXYGEN SATURATION: 99 %

## 2022-06-28 DIAGNOSIS — R91.8 PULMONARY NODULES: ICD-10-CM

## 2022-06-28 DIAGNOSIS — J40 BRONCHITIS: ICD-10-CM

## 2022-06-28 DIAGNOSIS — R91.8 PULMONARY NODULES: Primary | ICD-10-CM

## 2022-06-28 PROCEDURE — 99214 OFFICE O/P EST MOD 30 MIN: CPT | Performed by: INTERNAL MEDICINE

## 2022-06-28 PROCEDURE — 71250 CT THORAX DX C-: CPT | Mod: GC | Performed by: RADIOLOGY

## 2022-06-28 PROCEDURE — G0463 HOSPITAL OUTPT CLINIC VISIT: HCPCS

## 2022-06-28 RX ORDER — PREDNISONE 20 MG/1
20 TABLET ORAL DAILY
Qty: 5 TABLET | Refills: 1 | Status: SHIPPED | OUTPATIENT
Start: 2022-06-28 | End: 2022-07-03

## 2022-06-28 ASSESSMENT — PAIN SCALES - GENERAL: PAINLEVEL: NO PAIN (0)

## 2022-06-28 NOTE — NURSING NOTE
"Oncology Rooming Note    June 28, 2022 9:46 AM   Jon Yun is a 62 year old male who presents for:    Chief Complaint   Patient presents with     Oncology Clinic Visit     Rtn Pulmonary Nodules     Initial Vitals: /76 (BP Location: Right arm, Patient Position: Fowlers, Cuff Size: Adult Regular)   Pulse 73   Temp 98  F (36.7  C)   Wt 96.8 kg (213 lb 6.4 oz)   SpO2 99%   BMI 29.76 kg/m   Estimated body mass index is 29.76 kg/m  as calculated from the following:    Height as of 2/17/22: 1.803 m (5' 11\").    Weight as of this encounter: 96.8 kg (213 lb 6.4 oz). Body surface area is 2.2 meters squared.  No Pain (0)   No LMP for male patient.  Allergies reviewed: Yes  Medications reviewed: Yes    Medications: Flonase  Pharmacy name entered into [facility]: [facility]    Clinical concerns:Pt has no concerns for their provider on June 28, 2022 and would like to discuss the original chief complaint of the appointment.     Sushila Foster, EMT on June 28, 2022 at 9:49 AM            "

## 2022-06-28 NOTE — LETTER
6/28/2022       RE: Jon Yun  [address] [address]     Dear Colleague,    Thank you for referring your patient, Jon Yun, to the [facility] at [facility]. Please see a copy of my visit note below.    LUNG NODULE & INTERVENTIONAL PULMONARY CLINIC  [facility]     Jon Yun MRN# [id number]   Age: 62 year old YOB: 1960     Reason for Consultation: lung nodule(s)    Requesting Physician: Bacilio Mckoy MD  [address] [place] [address]       Assessment and Plan:    1. New multiple pulmonary lung nodule(s). Given the characteristics on current/previous imaging and risk factors; I would classify this to be Intermediate (6-65%) risk for cancer. Culprit nodule is 10mm in RLL. Plan to follow Fleischner Criteria. Next CT in 6mo.       Billing: I spent 30-39min (Established, 22859) on the date of the encounter doing chart review, history and exam, documentation and further activities as described in this note.    Shahriar Mock MD  Associate Professor of Medicine  Section of Interventional Pulmonology   Division of Pulmonary, Allergy, Critical Care and Sleep Medicine   [facility]  Pager: [phone]   Office: [phone]  Email: [email or web address]    Yesenia ARREOLA RN  Interventional Pulmonary Care Coordinator  Office: [phone]  Email: [email or web address]    Rachel Taylor  Interventional Pulmonology Surgery Scheduler  Office: [phone]  Email: [email or web address]         History:     Jon Yun is a 61 year old male with sig h/o for GERD, MDD who is here for evaluation/followup of lung nodule(s).     - No new resp sx or complaints. He does c/o dyspnea, abdominal pressure.   - here for evaluation of nodules. Last seen by me 3/22/22  - Personal hx of cancer: no   - Family  hx of cancer: dad had lung cancer   - Exposure hx: Denies asbestos or radon exposure   - Tobacco hx: Past Smoker: 2ppd since 25yrs. Quit 25yrs ago.   - My interpretation of the images relevant for this visit includes: nodules   - My interpretation of the PFT's relevant for this visit includes: None      Culprit Nodule(s): -needs updated ct  1. Multiple bilateral lung nodule(s) that are sub 10 mm. First seen by chest CT on 22. First 2. observed on this date   3. Largest nodules is RLL 10mm nodules.   4. Large Hiatal hernia     Other active medical problems include:   - has GERD. stable   - has MDD. Stable            Past Medical History:      Past Medical History:   Diagnosis Date     Chest pressure      Cough 2009     External hemorrhoids 2009     Gastritis; acute 2009     GERD (gastroesophageal reflux disease) 2009     Hiatal hernia 10/2009     Mild major depression (H) 2009           Past Surgical History:      Past Surgical History:   Procedure Laterality Date     COLONOSCOPY       COLONOSCOPY WITH CO2 INSUFFLATION N/A 3/15/2022    Procedure: COLONOSCOPY, WITH CO2 INSUFFLATION;  Surgeon: Raul Keen DO;  Location:  OR     COMBINED ESOPHAGOSCOPY, GASTROSCOPY, DUODENOSCOPY (EGD) WITH CO2 INSUFFLATION N/A 2022    Procedure: ESOPHAGOGASTRODUODENOSCOPY, WITH CO2 INSUFFLATION;  Surgeon: Radha Pierre DO;  Location:  OR     ENDOSCOPY UPPER, COLONOSCOPY, COMBINED  10/23/2012    Procedure: COMBINED ENDOSCOPY UPPER, COLONOSCOPY;  EGD, COLONOSCOPY, GERD, HISTORY OF CANCER, polypectomy in colon, biopsies of antrum & esophagus;  Surgeon: Corbin Slaughter MD;  Location:  OR     HC RECONSTR NOSE+SOULEYMANE SEPTAL REPAIR       ZZC OPEN RX ANKLE DISLOCATN            Social History:     Social History     Tobacco Use     Smoking status: Former Smoker     Years: 25.00     Types: Cigarettes     Quit date: 1998     Years since quittin.5     Smokeless tobacco: Never Used    Substance Use Topics     Alcohol use: Yes     Comment: occ          Family History:     Family History   Problem Relation Age of Onset     Thyroid Disease Mother      Cancer Father 74        Lung cancer     Cardiovascular Maternal Grandmother         Pacemaker age 67     Cancer Maternal Grandfather            Allergies:      Allergies   Allergen Reactions     Bees Swelling          Medications:     Current Outpatient Medications   Medication Sig     EPINEPHrine (ANY BX GENERIC EQUIV) 0.3 MG/0.3ML injection 2-pack INJECT 0.3 ML IN THE MUSCLE ONCE FOR 1 DOSE AS DIRECTED     fluticasone (FLONASE) 50 MCG/ACT nasal spray 1-2 sprays each nostril daily     levofloxacin (LEVAQUIN) 500 MG tablet Take 1 tablet (500 mg) by mouth daily     omeprazole (PRILOSEC) 20 MG DR capsule Take 1 capsule (20 mg) by mouth daily     tadalafil (CIALIS) 5 MG tablet TAKE ONE TABLET BY MOUTH EVERY DAY, MAY TAKE AN ADDITIONAL 1-3 TABLETS ONE HOUR PRIOR TO SEXUAL ACTIVITY     No current facility-administered medications for this visit.          Review of Systems:     CONSTITUTIONAL: negative for fever, chills, change in weight  INTEGUMENTARY/SKIN: no rash or obvious new lesions  ENT/MOUTH: no sore throat, new sinus pain or nasal drainage  RESP: see interval history  CV: negative for chest pain, palpitations or peripheral edema  GI: no nausea, vomiting, change in stools  : no dysuria  MUSCULOSKELETAL: no myalgias, arthralgias  ENDOCRINE: blood sugars with adequate control  PSYCHIATRIC: mood stable  LYMPHATIC: no new lymphadenopathy  HEME: no bleeding or easy bruisability  NEURO: no numbness, weakness, headaches         Physical Exam:     Constitutional - looks well, in no apparent distress  Respiratory -breathing appears comfortable.   Skin - No appreciable discoloration or lesions (very limited exam)  Neurological - No apparent tremors. Speech fluent and articlate          Current Laboratory Data:   All laboratory and imaging data reviewed.     No results found for this or any previous visit (from the past 24 hour(s)).         Previous Chest Imaging   No images are attached to the encounter.  No images are attached to the encounter or orders placed in the encounter.         Previous Cardiology Imaging   No results found for this or any previous visit (from the past 8760 hour(s)).                 Again, thank you for allowing me to participate in the care of your patient.      Sincerely,    Shahriar Mock MD

## 2022-06-28 NOTE — LETTER
6/28/2022      RE: Jon Yun  [address] [facility] [address]       LUNG NODULE & INTERVENTIONAL PULMONARY CLINIC  CLINICS & SURGERY [facility]     Jon Yun MRN# [id number]   Age: 62 year old YOB: 1960     Reason for Consultation: lung nodule(s)    Requesting Physician: Bacilio Mckoy MD  [address] [place] [address]       Assessment and Plan:    1. New multiple pulmonary lung nodule(s). Given the characteristics on current/previous imaging and risk factors; I would classify this to be Intermediate (6-65%) risk for cancer. Culprit nodule is 10mm in RLL. Plan to follow Fleischner Criteria. Next CT in 6mo.       Billing: I spent 30-39min (Established, 61446) on the date of the encounter doing chart review, history and exam, documentation and further activities as described in this note.    Shahriar Mock MD  Associate Professor of Medicine  Section of Interventional Pulmonology   Division of Pulmonary, Allergy, Critical Care and Sleep Medicine   [facility]  Pager: [phone]   Office: [phone]  Email: [email or web address]    Yesenia ARREOLA, RN  Interventional Pulmonary Care Coordinator  Office: [phone]  Email: [email or web address]    Rachel Taylor  Interventional Pulmonology Surgery Scheduler  Office: [phone]  Email: [email or web address]         History:     Jon Yun is a 61 year old male with sig h/o for GERD, MDD who is here for evaluation/followup of lung nodule(s).     - No new resp sx or complaints. He does c/o dyspnea, abdominal pressure.   - here for evaluation of nodules. Last seen by me 3/22/22  - Personal hx of cancer: no   - Family hx of cancer: dad had lung cancer   - Exposure hx: Denies asbestos or radon exposure   - Tobacco hx: Past Smoker: 2ppd since 25yrs. Quit 25yrs ago.   - My interpretation of the images relevant for this visit includes: nodules    - My interpretation of the PFT's relevant for this visit includes: None      Culprit Nodule(s): -needs updated ct  1. Multiple bilateral lung nodule(s) that are sub 10 mm. First seen by chest CT on 22. First 2. observed on this date   3. Largest nodules is RLL 10mm nodules.   4. Large Hiatal hernia     Other active medical problems include:   - has GERD. stable   - has MDD. Stable            Past Medical History:      Past Medical History:   Diagnosis Date     Chest pressure      Cough 2009     External hemorrhoids 2009     Gastritis; acute 2009     GERD (gastroesophageal reflux disease) 2009     Hiatal hernia 10/2009     Mild major depression (H) 2009           Past Surgical History:      Past Surgical History:   Procedure Laterality Date     COLONOSCOPY       COLONOSCOPY WITH CO2 INSUFFLATION N/A 3/15/2022    Procedure: COLONOSCOPY, WITH CO2 INSUFFLATION;  Surgeon: Raul Keen DO;  Location:  OR     COMBINED ESOPHAGOSCOPY, GASTROSCOPY, DUODENOSCOPY (EGD) WITH CO2 INSUFFLATION N/A 2022    Procedure: ESOPHAGOGASTRODUODENOSCOPY, WITH CO2 INSUFFLATION;  Surgeon: Radha Pierre DO;  Location: [facility] OR     ENDOSCOPY UPPER, COLONOSCOPY, COMBINED  10/23/2012    Procedure: COMBINED ENDOSCOPY UPPER, COLONOSCOPY;  EGD, COLONOSCOPY, GERD, HISTORY OF CANCER, polypectomy in colon, biopsies of antrum & esophagus;  Surgeon: Corbin Slaughter MD;  Location: [facility] OR     HC RECONSTR NOSE+SOULEYMANE SEPTAL REPAIR       ZZC OPEN RX ANKLE DISLOCATN            Social History:     Social History     Tobacco Use     Smoking status: Former Smoker     Years: 25.00     Types: Cigarettes     Quit date: 1998     Years since quittin.5     Smokeless tobacco: Never Used   Substance Use Topics     Alcohol use: Yes     Comment: occ          Family History:     Family History   Problem Relation Age of Onset     Thyroid Disease Mother      Cancer Father 74        Lung cancer     Cardiovascular  Maternal Grandmother         Pacemaker age 67     Cancer Maternal Grandfather            Allergies:      Allergies   Allergen Reactions     Bees Swelling          Medications:     Current Outpatient Medications   Medication Sig     EPINEPHrine (ANY BX GENERIC EQUIV) 0.3 MG/0.3ML injection 2-pack INJECT 0.3 ML IN THE MUSCLE ONCE FOR 1 DOSE AS DIRECTED     fluticasone (FLONASE) 50 MCG/ACT nasal spray 1-2 sprays each nostril daily     levofloxacin (LEVAQUIN) 500 MG tablet Take 1 tablet (500 mg) by mouth daily     omeprazole (PRILOSEC) 20 MG DR capsule Take 1 capsule (20 mg) by mouth daily     tadalafil (CIALIS) 5 MG tablet TAKE ONE TABLET BY MOUTH EVERY DAY, MAY TAKE AN ADDITIONAL 1-3 TABLETS ONE HOUR PRIOR TO SEXUAL ACTIVITY     No current facility-administered medications for this visit.          Review of Systems:     CONSTITUTIONAL: negative for fever, chills, change in weight  INTEGUMENTARY/SKIN: no rash or obvious new lesions  ENT/MOUTH: no sore throat, new sinus pain or nasal drainage  RESP: see interval history  CV: negative for chest pain, palpitations or peripheral edema  GI: no nausea, vomiting, change in stools  : no dysuria  MUSCULOSKELETAL: no myalgias, arthralgias  ENDOCRINE: blood sugars with adequate control  PSYCHIATRIC: mood stable  LYMPHATIC: no new lymphadenopathy  HEME: no bleeding or easy bruisability  NEURO: no numbness, weakness, headaches         Physical Exam:     Constitutional - looks well, in no apparent distress  Respiratory -breathing appears comfortable.   Skin - No appreciable discoloration or lesions (very limited exam)  Neurological - No apparent tremors. Speech fluent and articlate          Current Laboratory Data:   All laboratory and imaging data reviewed.    No results found for this or any previous visit (from the past 24 hour(s)).         Previous Chest Imaging   No images are attached to the encounter.  No images are attached to the encounter or orders placed in the  encounter.         Previous Cardiology Imaging   No results found for this or any previous visit (from the past 8760 hour(s)).                 Shahriar Mock MD

## 2022-07-03 ENCOUNTER — NURSE TRIAGE (OUTPATIENT)
Dept: NURSING | Facility: CLINIC | Age: 62
End: 2022-07-03

## 2022-07-03 NOTE — TELEPHONE ENCOUNTER
Wife is the caller.  Pt was out working yesterday [place]  When he went to the bathroom the water splashed up onto his buttocks and pt is concerned as he has hemorrhoids and pt wanting to know if there are precautions that should be taken.  Pt did shower after and cleanse buttocks. Care Advice given.    Serene Palmer RN  [facility] Nurse Advisor    Reason for Disposition    EXPOSURE of intact skin and with any body fluid    Additional Information    Negative: EXPOSURE to a body fluid and from needlestick or a wound from a sharp object    Negative: EXPOSURE to a body fluid and SOURCE has AIDS or is HIV positive    Negative: EXPOSURE of non-intact skin (e.g., exposed person has dermatitis, abrasion, wound) and with blood or body fluid containing visible blood    Negative: EXPOSURE of eye, mouth, or other mucous membrane and with blood or body fluid containing visible blood    Negative: EXPOSURE of eye, mouth, or other mucous membrane and with saliva from dental procedure    Negative: Bite with any break in skin (e.g., bite caused scratch, puncture) and with blood or body fluid containing visible blood    Negative: Patient sounds very sick or weak to the triager    Negative: EXPOSURE of intact skin and large amount of blood (e.g., 'hand was covered in blood')    Negative: EXPOSURE of non-intact skin (e.g., exposed person has dermatitis, abrasion, wound) and with any body fluid    Negative: EXPOSURE of mouth, or other mucous membrane and with any body fluid    Negative: EXPOSURE of intact skin and never received hepatitis B vaccine    Protocols used: BLOOD AND BODY FLUID EXPOSURE-A-OH

## 2022-07-08 ENCOUNTER — OFFICE VISIT (OUTPATIENT)
Dept: URGENT CARE | Facility: URGENT CARE | Age: 62
End: 2022-07-08
Payer: COMMERCIAL

## 2022-07-08 VITALS
BODY MASS INDEX: 30.82 KG/M2 | WEIGHT: 221 LBS | SYSTOLIC BLOOD PRESSURE: 124 MMHG | DIASTOLIC BLOOD PRESSURE: 85 MMHG | HEART RATE: 70 BPM | OXYGEN SATURATION: 94 % | TEMPERATURE: 97.6 F

## 2022-07-08 DIAGNOSIS — L03.113 RIGHT ARM CELLULITIS: ICD-10-CM

## 2022-07-08 DIAGNOSIS — T63.461A WASP STING, ACCIDENTAL OR UNINTENTIONAL, INITIAL ENCOUNTER: Primary | ICD-10-CM

## 2022-07-08 DIAGNOSIS — Z76.0 ENCOUNTER FOR MEDICATION REFILL: ICD-10-CM

## 2022-07-08 PROCEDURE — 99214 OFFICE O/P EST MOD 30 MIN: CPT | Performed by: PHYSICIAN ASSISTANT

## 2022-07-08 RX ORDER — CEPHALEXIN 500 MG/1
500 CAPSULE ORAL 3 TIMES DAILY
Qty: 15 CAPSULE | Refills: 0 | Status: SHIPPED | OUTPATIENT
Start: 2022-07-08 | End: 2022-07-13

## 2022-07-08 RX ORDER — FLUTICASONE PROPIONATE 50 MCG
1 SPRAY, SUSPENSION (ML) NASAL DAILY
Qty: 16 G | Refills: 0 | Status: SHIPPED | OUTPATIENT
Start: 2022-07-08 | End: 2022-07-18

## 2022-07-08 RX ORDER — PREDNISONE 20 MG/1
TABLET ORAL
Qty: 10 TABLET | Refills: 0 | Status: SHIPPED | OUTPATIENT
Start: 2022-07-08 | End: 2022-08-26

## 2022-07-08 NOTE — LETTER
[facility] [address] [address]  Phone: [phone]    July 8, 2022        Jon Yun  [address] [facility] [address]          To whom it may concern:    RE: Jon Yun    Patient was seen and treated today at our clinic and missed work.    Please contact me for questions or concerns.      Sincerely,        Chely Clarke PA-C

## 2022-07-08 NOTE — PROGRESS NOTES
Chief Complaint   Patient presents with     Insect Bites     Right hand/ wrist; happened 7/7                   ASSESSMENT:     ICD-10-CM    1. Wasp sting, accidental or unintentional, initial encounter  T63.461A cephALEXin (KEFLEX) 500 MG capsule     predniSONE (DELTASONE) 20 MG tablet   2. Right arm cellulitis  L03.113 cephALEXin (KEFLEX) 500 MG capsule     predniSONE (DELTASONE) 20 MG tablet   3. Encounter for medication refill  Z76.0 fluticasone (FLONASE) 50 MCG/ACT nasal spray       PLAN: Wasp sting reaction with cellulitis.  Prednisone and Keflex antibiotic.  Continue Benadryl every 4-6 hours as needed.  Also here for med refill on Flonase.  To ER if tongue, lip or throat swelling or difficulty breathing.  Return to clinic if fever, redness or pain worsens.  I have discussed clinical findings with patient.  Side effects of medications discussed.  Symptomatic care is discussed.  I have discussed the possibility of  worsening symptoms and indication to RTC or go to the ER if they occur.  All questions are answered, patient indicates understanding of these issues and is in agreement with plan.   Patient care instructions are discussed/given at the end of visit.   Lots of rest and fluids.      Chely Clarke PA-C      SUBJECTIVE:    62-year-old male presents for wasp sting yesterday on his right hand and wrist 7/7.  Arm with warmth, swelling, pain.  Allergies to bee stings.  No fever.  Tried Benadryl earlier today without significant relief.  No difficulty breathing.  No tongue, lip, face swelling.    Allergies   Allergen Reactions     Bees Swelling       Past Medical History:   Diagnosis Date     Chest pressure      Cough 8/13/2009     External hemorrhoids 8/13/2009     Gastritis; acute 8/13/2009     GERD (gastroesophageal reflux disease) 8/13/2009     Hiatal hernia 10/2009     Mild major depression (H) 8/13/2009       EPINEPHrine (ANY BX GENERIC EQUIV) 0.3 MG/0.3ML injection 2-pack, INJECT 0.3 ML IN THE  MUSCLE ONCE FOR 1 DOSE AS DIRECTED  fluticasone (FLONASE) 50 MCG/ACT nasal spray, 1-2 sprays each nostril daily  omeprazole (PRILOSEC) 20 MG DR capsule, Take 1 capsule (20 mg) by mouth daily  tadalafil (CIALIS) 5 MG tablet, TAKE ONE TABLET BY MOUTH EVERY DAY, MAY TAKE AN ADDITIONAL 1-3 TABLETS ONE HOUR PRIOR TO SEXUAL ACTIVITY  vitamin C (ASCORBIC ACID) 100 MG tablet, Take 100 mg by mouth    No current facility-administered medications on file prior to visit.      Social History     Tobacco Use     Smoking status: Former Smoker     Years: 25.00     Types: Cigarettes     Quit date: 1996     Years since quittin.0     Smokeless tobacco: Never Used   Substance Use Topics     Alcohol use: Yes     Comment: occ       ROS:  CONSTITUTIONAL: Negative for fatigue or fever.  EYES: Negative for eye problems.  ENT: As above.  RESP: As above.  CV: Negative for chest pains.  GI: Negative for vomiting.  MUSCULOSKELETAL:  Negative for significant muscle or joint pains.  NEUROLOGIC: Negative for headaches.  SKIN: as above.  PSYCH: Normal mentation for age.    OBJECTIVE:  /85   Pulse 70   Temp 97.6  F (36.4  C) (Tympanic)   Wt 100.2 kg (221 lb)   SpO2 94%   BMI 30.82 kg/m    GENERAL APPEARANCE: Healthy, alert and no distress.  EYES:Conjunctiva/sclera clear.  RESP: Lungs clear to auscultation - no rales, rhonchi or wheezes  CV: Regular rate and rhythm, normal S1 S2, no murmur noted.  NEURO: Awake, alert    No facial swelling  SKIN: Entire right forearm and hand are with some swelling.  2 punctate lesions noted, 1 right anterior dorsal wrist and one bite of the third MCP knuckle.  Subtle redness in entire forearm.  Hard to see as he has read tones to his skin but I compare it to the other 1.  Entire right forearm feels warm to palpation compared to the left.          Chely Clarke PA-C

## 2022-08-26 ENCOUNTER — TELEPHONE (OUTPATIENT)
Dept: FAMILY MEDICINE | Facility: CLINIC | Age: 62
End: 2022-08-26

## 2022-08-26 DIAGNOSIS — R06.00 DYSPNEA, UNSPECIFIED TYPE: Primary | ICD-10-CM

## 2022-08-26 DIAGNOSIS — Z87.891 PERSONAL HISTORY OF TOBACCO USE, PRESENTING HAZARDS TO HEALTH: ICD-10-CM

## 2022-09-15 ENCOUNTER — OFFICE VISIT (OUTPATIENT)
Dept: FAMILY MEDICINE | Facility: CLINIC | Age: 62
End: 2022-09-15
Payer: COMMERCIAL

## 2022-09-15 VITALS
SYSTOLIC BLOOD PRESSURE: 124 MMHG | DIASTOLIC BLOOD PRESSURE: 80 MMHG | BODY MASS INDEX: 30.44 KG/M2 | HEART RATE: 71 BPM | TEMPERATURE: 97.6 F | WEIGHT: 218.25 LBS | OXYGEN SATURATION: 98 %

## 2022-09-15 DIAGNOSIS — R53.83 FATIGUE, UNSPECIFIED TYPE: ICD-10-CM

## 2022-09-15 DIAGNOSIS — U09.9 LONG COVID: ICD-10-CM

## 2022-09-15 DIAGNOSIS — N40.1 BENIGN PROSTATIC HYPERPLASIA WITH LOWER URINARY TRACT SYMPTOMS, SYMPTOM DETAILS UNSPECIFIED: ICD-10-CM

## 2022-09-15 DIAGNOSIS — R05.9 COUGH: Primary | ICD-10-CM

## 2022-09-15 DIAGNOSIS — R73.01 IMPAIRED FASTING GLUCOSE: ICD-10-CM

## 2022-09-15 DIAGNOSIS — Z87.891 HISTORY OF SMOKING: ICD-10-CM

## 2022-09-15 DIAGNOSIS — K21.9 GASTROESOPHAGEAL REFLUX DISEASE, UNSPECIFIED WHETHER ESOPHAGITIS PRESENT: ICD-10-CM

## 2022-09-15 DIAGNOSIS — Z13.6 CARDIOVASCULAR SCREENING; LDL GOAL LESS THAN 100: ICD-10-CM

## 2022-09-15 DIAGNOSIS — Z91.09 ENVIRONMENTAL ALLERGIES: ICD-10-CM

## 2022-09-15 PROBLEM — Z11.4 SCREENING FOR HIV (HUMAN IMMUNODEFICIENCY VIRUS): Status: ACTIVE | Noted: 2022-09-15

## 2022-09-15 PROBLEM — Z11.59 NEED FOR HEPATITIS C SCREENING TEST: Status: ACTIVE | Noted: 2022-09-15

## 2022-09-15 LAB
ALBUMIN SERPL-MCNC: 3.7 G/DL (ref 3.4–5)
ALP SERPL-CCNC: 57 U/L (ref 40–150)
ALT SERPL W P-5'-P-CCNC: 38 U/L (ref 0–70)
ANION GAP SERPL CALCULATED.3IONS-SCNC: 6 MMOL/L (ref 3–14)
AST SERPL W P-5'-P-CCNC: 21 U/L (ref 0–45)
BASOPHILS # BLD AUTO: 0.1 10E3/UL (ref 0–0.2)
BASOPHILS NFR BLD AUTO: 1 %
BILIRUB SERPL-MCNC: 0.4 MG/DL (ref 0.2–1.3)
BUN SERPL-MCNC: 19 MG/DL (ref 7–30)
CALCIUM SERPL-MCNC: 9 MG/DL (ref 8.5–10.1)
CHLORIDE BLD-SCNC: 111 MMOL/L (ref 94–109)
CHOLEST SERPL-MCNC: 180 MG/DL
CO2 SERPL-SCNC: 27 MMOL/L (ref 20–32)
CREAT SERPL-MCNC: 0.68 MG/DL (ref 0.66–1.25)
EOSINOPHIL # BLD AUTO: 0.4 10E3/UL (ref 0–0.7)
EOSINOPHIL NFR BLD AUTO: 8 %
ERYTHROCYTE [DISTWIDTH] IN BLOOD BY AUTOMATED COUNT: 14.8 % (ref 10–15)
FASTING STATUS PATIENT QL REPORTED: YES
GFR SERPL CREATININE-BSD FRML MDRD: >90 ML/MIN/1.73M2
GLUCOSE BLD-MCNC: 112 MG/DL (ref 70–99)
HBA1C MFR BLD: 5.7 % (ref 0–5.6)
HCT VFR BLD AUTO: 45.1 % (ref 40–53)
HDLC SERPL-MCNC: 58 MG/DL
HGB BLD-MCNC: 15.2 G/DL (ref 13.3–17.7)
LDLC SERPL CALC-MCNC: 110 MG/DL
LYMPHOCYTES # BLD AUTO: 1.6 10E3/UL (ref 0.8–5.3)
LYMPHOCYTES NFR BLD AUTO: 29 %
MCH RBC QN AUTO: 30.7 PG (ref 26.5–33)
MCHC RBC AUTO-ENTMCNC: 33.7 G/DL (ref 31.5–36.5)
MCV RBC AUTO: 91 FL (ref 78–100)
MONOCYTES # BLD AUTO: 0.6 10E3/UL (ref 0–1.3)
MONOCYTES NFR BLD AUTO: 11 %
NEUTROPHILS # BLD AUTO: 2.8 10E3/UL (ref 1.6–8.3)
NEUTROPHILS NFR BLD AUTO: 52 %
NONHDLC SERPL-MCNC: 122 MG/DL
PLATELET # BLD AUTO: 206 10E3/UL (ref 150–450)
POTASSIUM BLD-SCNC: 4.4 MMOL/L (ref 3.4–5.3)
PROT SERPL-MCNC: 6.8 G/DL (ref 6.8–8.8)
RBC # BLD AUTO: 4.95 10E6/UL (ref 4.4–5.9)
SODIUM SERPL-SCNC: 144 MMOL/L (ref 133–144)
TRIGL SERPL-MCNC: 61 MG/DL
WBC # BLD AUTO: 5.5 10E3/UL (ref 4–11)

## 2022-09-15 PROCEDURE — 85025 COMPLETE CBC W/AUTO DIFF WBC: CPT | Performed by: FAMILY MEDICINE

## 2022-09-15 PROCEDURE — 80053 COMPREHEN METABOLIC PANEL: CPT | Performed by: FAMILY MEDICINE

## 2022-09-15 PROCEDURE — 36415 COLL VENOUS BLD VENIPUNCTURE: CPT | Performed by: FAMILY MEDICINE

## 2022-09-15 PROCEDURE — 80061 LIPID PANEL: CPT | Performed by: FAMILY MEDICINE

## 2022-09-15 PROCEDURE — 99214 OFFICE O/P EST MOD 30 MIN: CPT | Performed by: FAMILY MEDICINE

## 2022-09-15 PROCEDURE — 83036 HEMOGLOBIN GLYCOSYLATED A1C: CPT | Performed by: FAMILY MEDICINE

## 2022-09-15 ASSESSMENT — PAIN SCALES - GENERAL: PAINLEVEL: NO PAIN (0)

## 2022-09-15 ASSESSMENT — ENCOUNTER SYMPTOMS: COUGH: 1

## 2022-09-15 NOTE — PATIENT INSTRUCTIONS
Take the omeprazole on a daily basis for next month    We willl send you lab results    Take loratadine 10 daily over the counter ( generic claritin )    Use flonase daily    See us in a month if not better

## 2022-09-15 NOTE — PROGRESS NOTES
Antonio Reyes is a 62 year old , presenting for the following health issues:  Cough      Cough    History of Present Illness       Reason for visit:  Phlegm occurring often    He eats 2-3 servings of fruits and vegetables daily.He consumes 0 sweetened beverage(s) daily.He exercises with enough effort to increase his heart rate 30 to 60 minutes per day.  He exercises with enough effort to increase his heart rate 5 days per week.   He is taking medications regularly.        Review of Systems   Respiratory: Positive for cough.       Most in am    Yellow/ green phlegm    Wondering about hiatal hernia    Not related to exertion    Better with eating sometimes    covid in Feb, bad cough with that    Supplements / vitamins    Walking route, delivers     Low stream  Frequent wakings for urination    Had prescription for levofloxacin from lung doctor             Objective    /80 (BP Location: Left arm, Patient Position: Chair, Cuff Size: Adult Regular)   Pulse 71   Temp 97.6  F (36.4  C) (Temporal)   Wt 99 kg (218 lb 4 oz)   SpO2 98%   BMI 30.44 kg/m    Body mass index is 30.44 kg/m .  Physical Exam  Constitutional:       Appearance: He is well-developed.   HENT:      Head: Normocephalic and atraumatic.      Right Ear: Ear canal and external ear normal.      Left Ear: Ear canal and external ear normal.      Ears:      Comments: Lots of soft wax bilat     Nose: Nose normal.      Mouth/Throat:      Mouth: Mucous membranes are moist.      Pharynx: Oropharynx is clear.   Eyes:      Conjunctiva/sclera: Conjunctivae normal.   Neck:      Vascular: No carotid bruit.   Cardiovascular:      Rate and Rhythm: Normal rate and regular rhythm.      Heart sounds: Normal heart sounds.   Pulmonary:      Effort: Pulmonary effort is normal. No respiratory distress.      Breath sounds: Normal breath sounds.   Neurological:      Mental Status: He is alert and oriented to person, place, and time.      Cranial Nerves: No cranial  nerve deficit.   Psychiatric:         Speech: Speech normal.         Behavior: Behavior normal.         no sinus / submandib tenderness    No  Edema    Radial okay         ASSESSMENT / PLAN:      Cough: likely multifactorial.  Long covid as symptoms did get bad in Feb with covid.  Some allergy component, and reflux.      (R73.01) Impaired fasting glucose  (primary encounter diagnosis)  Comment: check   Plan: Hemoglobin A1c                (R53.83) Fatigue, unspecified type  Comment: check labs   Plan: CBC with Platelets & Differential,         Comprehensive metabolic panel             (Z13.6) CARDIOVASCULAR SCREENING; LDL GOAL LESS THAN 100  Comment: fasting today  Plan: Lipid panel reflex to direct LDL Fasting             (U09.9) Long COVID  Comment: discussed   Plan: monitor    (Z87.891) History of smoking  Comment: recent CT showed some emphysema.  He did see lung doctor.  To get another CT in Dec   Plan: as above     Env allergies: take loratadine daily    Gerd: use the ppi daily    If wbc high consider adding antibiotics    Also discussed bph, hold off on meds for now    Follow up if symptoms not resolving    Be seen promptly if symptoms acutely worsen       I reviewed the patient's medications, allergies, medical history, family history, and social history.    Bacilio Mckoy MD

## 2022-09-17 NOTE — RESULT ENCOUNTER NOTE
"White blood count is fine.  Hold off on antibiotics.    Hemoglobin a1c is barely into the \"prediabetes\" range.  No medications needed.  Just keep working on healthy diet/exercise.    LDL \"bad\" cholesterol slightly high.  Try to minimize saturated fats and avoid trans fats.    Other labs are fine.    Bacilio Mckoy MD  "

## 2022-10-10 ENCOUNTER — HEALTH MAINTENANCE LETTER (OUTPATIENT)
Age: 62
End: 2022-10-10

## 2022-10-24 ENCOUNTER — MYC MEDICAL ADVICE (OUTPATIENT)
Dept: FAMILY MEDICINE | Facility: CLINIC | Age: 62
End: 2022-10-24

## 2022-11-02 ENCOUNTER — IMMUNIZATION (OUTPATIENT)
Dept: FAMILY MEDICINE | Facility: CLINIC | Age: 62
End: 2022-11-02
Payer: COMMERCIAL

## 2022-11-02 DIAGNOSIS — Z23 NEED FOR PROPHYLACTIC VACCINATION AND INOCULATION AGAINST INFLUENZA: Primary | ICD-10-CM

## 2022-11-02 PROCEDURE — 99207 PR NO CHARGE NURSE ONLY: CPT

## 2022-11-02 PROCEDURE — 90682 RIV4 VACC RECOMBINANT DNA IM: CPT

## 2022-11-02 PROCEDURE — 90471 IMMUNIZATION ADMIN: CPT

## 2022-11-27 ENCOUNTER — HEALTH MAINTENANCE LETTER (OUTPATIENT)
Age: 62
End: 2022-11-27

## 2023-01-03 ENCOUNTER — VIRTUAL VISIT (OUTPATIENT)
Dept: FAMILY MEDICINE | Facility: CLINIC | Age: 63
End: 2023-01-03
Payer: COMMERCIAL

## 2023-01-03 DIAGNOSIS — J06.9 URI WITH COUGH AND CONGESTION: Primary | ICD-10-CM

## 2023-01-03 PROCEDURE — 99213 OFFICE O/P EST LOW 20 MIN: CPT | Mod: 95 | Performed by: PHYSICIAN ASSISTANT

## 2023-01-03 RX ORDER — BENZONATATE 200 MG/1
200 CAPSULE ORAL 3 TIMES DAILY PRN
Qty: 30 CAPSULE | Refills: 0 | Status: SHIPPED | OUTPATIENT
Start: 2023-01-03 | End: 2023-11-22

## 2023-01-03 NOTE — LETTER
January 3, 2023      Jon Yun  [address] [facility] [address]        To Whom It May Concern:    Jon Yun was seen in our clinic. Patient was diagnosed with an illness. He may return to work 1/9/2023 without restrictions or sooner if his symptoms improve.       Sincerely,        Veronika Jones PA-C

## 2023-01-03 NOTE — PROGRESS NOTES
"Eric is a 62 year old who is being evaluated via a billable video visit.      How would you like to obtain your AVS? MyChart  If the video visit is dropped, the invitation should be resent by:   Will anyone else be joining your video visit? 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 Symptom treatments discussed.   - benzonatate (TESSALON) 200 MG capsule; Take 1 capsule (200 mg) by mouth 3 times daily as needed for cough             BMI:   Estimated body mass index is 30.44 kg/m  as calculated from the following:    Height as of 2/17/22: 1.803 m (5' 11\").    Weight as of 9/15/22: 99 kg (218 lb 4 oz).           No follow-ups on file.    [facility]    Antonio Reyes is a 62 year old, presenting for the following health issues:  No chief complaint on file.      History of Present Illness       Reason for visit:  Sore throat, coughing up green, cough, anxiety, feeling energy  Symptom onset:  3-7 days ago  Symptoms include:  Sore throat, cough, low energy  Symptom intensity:  Moderate  Symptom progression:  Staying the same  Had these symptoms before:  Yes  Has tried/received treatment for these symptoms:  Yes  Previous treatment was successful:  Yes  Prior treatment description:  Rest    He eats 0-1 servings of fruits and vegetables daily.He consumes 0 sweetened beverage(s) daily.He exercises with enough effort to increase his heart rate 60 or more minutes per day.  He exercises with enough effort to increase his heart rate 5 days per week.        Low energy.   No fevers.   Started on 12/30/22.   Works outside carrying mail.   Used to smoke and history of emphysema. Not on any inhalers.   Called into work. Needs a note.   Home COVID test negative.   No shortness of breath       Review of Systems         Objective           Vitals:  No vitals were obtained today due to virtual " visit.    Physical Exam   GENERAL: Healthy, alert and no distress  EYES: Eyes grossly normal to inspection.  No discharge or erythema, or obvious scleral/conjunctival abnormalities.  RESP: No audible wheeze, cough, or visible cyanosis.  No visible retractions or increased work of breathing.    SKIN: Visible skin clear. No significant rash, abnormal pigmentation or lesions.  NEURO: Cranial nerves grossly intact.  Mentation and speech appropriate for age.  PSYCH: Mentation appears normal, affect normal/bright, judgement and insight intact, normal speech and appearance well-groomed.                Video-Visit Details    Type of service:  Video Visit     Originating Location (pt. Location): Home    Distant Location (provider location):  On-site  Platform used for Video Visit: Triston

## 2023-01-03 NOTE — PATIENT INSTRUCTIONS
Mucinex twice per day.     Use tessalon as needed for the cough.     Tylenol for the sore throat.     If shortness of breath or new fever call the clinic.

## 2023-01-21 ENCOUNTER — NURSE TRIAGE (OUTPATIENT)
Dept: NURSING | Facility: CLINIC | Age: 63
End: 2023-01-21
Payer: COMMERCIAL

## 2023-01-21 DIAGNOSIS — R05.9 COUGH: ICD-10-CM

## 2023-01-21 DIAGNOSIS — U07.1 INFECTION DUE TO 2019 NOVEL CORONAVIRUS: Primary | ICD-10-CM

## 2023-01-22 NOTE — TELEPHONE ENCOUNTER
"Coronavirus (COVID-19) Notification    Caller Name (Patient, parent, daughter/son, grandparent, etc)  Eric    Reason for call  Notify of Positive Coronavirus (COVID-19) lab results, assess symptoms,  review [facility] recommendations    Lab Result    Lab test:  2019-nCoV rRt-PCR or SARS-CoV-2 PCR    Oropharyngeal AND/OR nasopharyngeal swabs is POSITIVE for 2019-nCoV RNA/SARS-COV-2 PCR (COVID-19 virus)    Brief introduction script  Introduce self then review script:  \"I am calling on behalf of [employer].  We were notified that your Coronavirus test (COVID-19) for was POSITIVE for the virus.\"    Gather patient reported symptoms   Assessment   Current Symptoms at time of phone call, reported by patient: (if no symptoms, document No symptoms] Sore throat, stuffy   Date of Symptom(s) onset (if applicable) 1/21/2023     If at time of call, Patients symptoms hare worsened, the Patient should contact 911 or have someone drive them to Emergency Dept promptly:      If Patient calling 911, inform 911 personal that you have tested positive for the Coronavirus (COVID-19).  Place mask on and await 911 to arrive.    If Emergency Dept, If possible, please have another adult drive you to the Emergency Dept but you need to wear mask when in contact with other people.      Monoclonal Antibody Administration    You may be eligible to receive a new treatment with a monoclonal antibody for preventing hospitalization in patients at high risk for complications from COVID-19. This medication is still experimental and available on a limited basis; it is given through an IV and must be given at an infusion center. Please note that not all people who are eligible will receive the medication since it is in limited supply.  Is the patient symptomatic and onset of symptoms within the last 7 days?  Yes  Is the patient interested in a visit with a provider to discuss treatment options?: Yes  Is the patient seen at [facility] or [facility]?  No: Warm transfer caller to [phone] to be scheduled with a virtual urgent provider.  During transfer, instruct  on appropriate time frame for visit     Review information with Patient    Your result was positive. This means you have COVID-19 (coronavirus).      How can I protect others?    These guidelines are for isolating before returning to work, school or .       If you DO have symptoms:  o Stay home and away from others  - For at least 5 days after your symptoms started, AND   - You are fever free for 24 hours (with no medicine that reduces fever), AND  - Your other symptoms are better.  o Wear a mask for 10 full days any time you are around others.    If you DON'T have symptoms:  o Stay at home and away from others for at least 5 days after your positive test.  o Wear a mask for 10 full days any time you are around others.    There may be different guidelines for healthcare facilities. Please check with the specific sites before arriving.     If you've been told by a doctor that you were severely ill with COVID-19 or are immunocompromised, you should isolate for at least 10 days.    You should not go back to work until you meet the guidelines above for ending your home isolation. You don't need to be retested for COVID-19 before going back to work--studies show that you won't spread the virus if it's been at least 10 days since your symptoms started (or 20 days, if you have a weak immune system).    Employers, schools, and daycares: This is an official notice for this person's medical guidelines for returning in-person. They must meet the above guidelines before going back to work, school, or  in person.    You will receive a positive COVID-19 letter via [employer] or the mail soon with additional self-care information.      Would you like me to review some of that information with you now?  Yes    How can I take care of myself?      Get lots of rest. Drink extra fluids (unless  a doctor has told you not to).      Take Tylenol (acetaminophen) for fever or pain. If you have liver or kidney problems, ask your family doctor if it's okay to take Tylenol.     Take either:     650 mg (two 325 mg pills) every 4 to 6 hours, or     1,000 mg (two 500 mg pills) every 8 hours as needed.     Note: Do not take more than 3,000 mg in one day. Acetaminophen is found in many medicines (both prescribed and over-the-counter medicines). Read all labels to be sure you don't take too much.    For children, check the Tylenol bottle for the right dose (based on their age or weight).      If you have other health problems (like cancer, heart failure, an organ transplant or severe kidney disease): Call your specialty clinic if you don't feel better in the next 2 days.      Know when to call 911: Emergency warning signs include:    Trouble breathing or shortness of breath    Pain or pressure in the chest that doesn't go away    Feeling confused like you haven't felt before, or not being able to wake up    Bluish-colored lips or face        If you were tested for an upcoming procedure, please contact your provider for next steps.     Garima Phillips RN    Reason for Disposition    [1] COVID-19 diagnosed by positive lab test (e.g., PCR, rapid self-test kit) AND [2] mild symptoms (e.g., cough, fever, others) AND [3] no complications or SOB    Additional Information    Negative: SEVERE difficulty breathing (e.g., struggling for each breath, speaks in single words)    Negative: Difficult to awaken or acting confused (e.g., disoriented, slurred speech)    Negative: Bluish (or gray) lips or face now    Negative: Shock suspected (e.g., cold/pale/clammy skin, too weak to stand, low BP, rapid pulse)    Negative: Sounds like a life-threatening emergency to the triager    Negative: [1] Diagnosed or suspected COVID-19 AND [2] symptoms lasting 3 or more weeks    Negative: [1] COVID-19 exposure AND [2] no symptoms    Negative:  COVID-19 vaccine reaction suspected (e.g., fever, headache, muscle aches) occurring 1 to 3 days after getting vaccine    Negative: COVID-19 vaccine, questions about    Negative: [1] Lives with someone known to have influenza (flu test positive) AND [2] flu-like symptoms (e.g., cough, runny nose, sore throat, SOB; with or without fever)    Negative: [1] Adult with possible COVID-19 symptoms AND [2] triager concerned about severity of symptoms or other causes    Negative: COVID-19 and breastfeeding, questions about    Negative: SEVERE or constant chest pain or pressure  (Exception: Mild central chest pain, present only when coughing.)    Negative: MODERATE difficulty breathing (e.g., speaks in phrases, SOB even at rest, pulse 100-120)    Negative: [1] Headache AND [2] stiff neck (can't touch chin to chest)    Negative: Oxygen level (e.g., pulse oximetry) 90 percent or lower    Negative: Chest pain or pressure    Negative: Patient sounds very sick or weak to the triager    Negative: MILD difficulty breathing (e.g., minimal/no SOB at rest, SOB with walking, pulse <100)    Negative: Fever > 103 F (39.4 C)    Negative: [1] Fever > 101 F (38.3 C) AND [2] age > 60 years    Negative: [1] Fever > 100.0 F (37.8 C) AND [2] bedridden (e.g., nursing home patient, CVA, chronic illness, recovering from surgery)    Negative: HIGH RISK for severe COVID complications (e.g., weak immune system, age > 64 years, obesity with BMI > 25, pregnant, chronic lung disease or other chronic medical condition)  (Exception: Already seen by PCP and no new or worsening symptoms.)    Negative: [1] HIGH RISK patient AND [2] influenza is widespread in the community AND [3] ONE OR MORE respiratory symptoms: cough, sore throat, runny or stuffy nose    Negative: [1] HIGH RISK patient AND [2] influenza exposure within the last 7 days AND [3] ONE OR MORE respiratory symptoms: cough, sore throat, runny or stuffy nose    Negative: Oxygen level (e.g., pulse  oximetry) 91 to 94 percent    Negative: Fever present > 3 days (72 hours)    Negative: [1] Fever returns after gone for over 24 hours AND [2] symptoms worse or not improved    Negative: [1] Continuous (nonstop) coughing interferes with work or school AND [2] no improvement using cough treatment per Care Advice    Negative: [1] COVID-19 infection suspected by caller or triager AND [2] mild symptoms (cough, fever, or others) AND [3] negative COVID-19 rapid test    Negative: Cough present > 3 weeks    Negative: [1] COVID-19 diagnosed by positive lab test (e.g., PCR, rapid self-test kit) AND [2] NO symptoms (e.g., cough, fever, others)    Protocols used: CORONAVIRUS (COVID-19) DIAGNOSED OR [id number] 1.18.2022

## 2023-01-22 NOTE — TELEPHONE ENCOUNTER
RN COVID TREATMENT VISIT  01/22/23    Jon Yun  62 year old  Current weight? 220     Has the patient been seen by a primary care provider at an [facility] or [facility] within the past two years? Yes.   Have you been in close proximity to/do you have a known exposure to a person with a confirmed case of influenza? No.     Date of positive COVID test (PCR or at home)?  1/21/2023    Current COVID symptoms: cough, shortness of breath or difficulty breathing and congestion or runny nose    Date COVID symptoms began: 1/21/2023    Do you have any of the following conditions that place you at risk of being very sick from COVID-19? chronic lung disease, mental health conditions and smoking (current or former)    Is patient eligible to continue? Yes, established patient, 12 years or older weighing at least 88.2 lbs, who has COVID symptoms that started in the past 5 days and is at risk for being very sick from COVID-19.       Have you received monoclonal antibodies or oral antiviral medications since testing positive to COVID-19? No    Are you currently hospitalized for COVID-19? No    Do you have a history of hepatitis? No    Are you currently pregnant or nursing? No    Do you have a clinically significant hypersensitivity to nirmatrelvir, ritonavir, or molnupiravir? No    Do you have any history of severe renal impairment (eGFR < 30mL/min)? No    Do you have any history of hepatic impairment or abnormalities (e.g. hepatic panel, ALT, AST, ALK Phos, bilirubin)? No    Have you had a coronary stent placed in the previous 6 months? No    Is patient eligible to continue?   Yes, patient meets all eligibility requirements for the RN COVID treatment (as denoted by all no responses above).     Current Outpatient Medications   Medication Sig Dispense Refill     benzonatate (TESSALON) 200 MG capsule Take 1 capsule (200 mg) by mouth 3 times daily as needed for cough 30 capsule 0     EPINEPHrine (ANY  BX GENERIC EQUIV) 0.3 MG/0.3ML injection 2-pack INJECT 0.3 ML IN THE MUSCLE ONCE FOR 1 DOSE AS DIRECTED       fluticasone (FLONASE) 50 MCG/ACT nasal spray 1-2 sprays each nostril daily 16 g 11     omeprazole (PRILOSEC) 20 MG DR capsule Take 1 capsule (20 mg) by mouth daily 30 capsule 1     tadalafil (CIALIS) 5 MG tablet TAKE ONE TABLET BY MOUTH EVERY DAY, MAY TAKE AN ADDITIONAL 1-3 TABLETS ONE HOUR PRIOR TO SEXUAL ACTIVITY         Medications from List 1 of the standing order (on medications that exclude the use of Paxlovid) that patient is taking: NONE. Is patient taking Radha's Wort? No  Is patient taking [place]'s Wort or any meds from List 1? No.   Medications from List 2 of the standing order (on meds that provider needs to adjust) that patient is taking: NONE. Is patient on any of the meds from List 2? No.   Medications from List 3 of standing order (on meds that a RN needs to adjust) that patient is taking: tadalafil (Adcirca, Alyq, Cialis, Tadliq): Is patient using for erectile dysfunction? Yes, instructed patient to stop taking tadalafil while taking Paxlovid and restart tadalafil 3 days after the completion of Paxlovid. Is patient on any meds from List 3? Yes. Patient is on meds from list 3. No meds require a provider visit and at least one med required RN to adjust.   In order of efficacy, Paxlovid has an approximate 90% reduction in hospitalization. Paxlovid can possibly cause altered sense of taste, diarrhea (loose, watery stools), high blood pressure, muscle aches.  The other option is molnupiravir which has an approximate 30% reduction in hospitalization. Molnupirarivr can possibly cause diarrhea (loose, watery stools), nausea (feeling sick to your stomach), dizziness, headaches.    Which treatment option does the patient prefer?   Paxlovid.   Lab Results   Component Value Date    GFRESTIMATED >90 09/15/2022       Was last eGFR reduced? No, eGFR 60 or greater/ No Result on record. Patient can  receive the normal renal function dose. Paxlovid Rx sent to [place] [facility]   [facility] [place]    Temporary change to home medications: tadalafil (Adcirca, Alyq, Cialis, Tadliq): Is patient using for erectile dysfunction? Yes, instructed patient to stop taking tadalafil while taking Paxlovid and restart tadalafil 3 days after the completion of Paxlovid.    All medication adjustments (holds, etc) were discussed with the patient and patient was asked to repeat back (teachback) their med adjustment.  Did patient understand med adjustment? Yes, patient repeated back and understood correctly.        Reviewed the following instructions with the patient:    Paxlovid (nimatrelvir and ritonavir)    How it works  Two medicines (nirmatrelvir and ritonavir) are taken together. They stop the virus from growing. Less amount of virus is easier for your body to fight.    How to take    Medicine comes in a daily container with both medicine tablets. Take by mouth twice daily (once in the morning, once at night) for 5 days.    The number of tablets to take varies by patient.    Don't chew or break capsules. Swallow whole.    When to take  Take as soon as possible after positive COVID-19 test result, and within 5 days of your first symptoms.    Possible side effects  Can cause altered sense of taste, diarrhea (loose, watery stools), high blood pressure, muscle aches.    Yancy Marquez RN

## 2023-01-26 ENCOUNTER — TELEPHONE (OUTPATIENT)
Dept: FAMILY MEDICINE | Facility: CLINIC | Age: 63
End: 2023-01-26

## 2023-01-26 ENCOUNTER — E-VISIT (OUTPATIENT)
Dept: FAMILY MEDICINE | Facility: CLINIC | Age: 63
End: 2023-01-26
Payer: COMMERCIAL

## 2023-01-26 DIAGNOSIS — U07.1 SARS-COV-2 POSITIVE: Primary | ICD-10-CM

## 2023-01-26 PROCEDURE — 99207 PR NON-BILLABLE SERV PER CHARTING: CPT | Performed by: PHYSICIAN ASSISTANT

## 2023-01-26 NOTE — LETTER
January 27, 2023      [address]  [address] [id number]        To Whom It May Concern:    Jon uYn has COVID-19. He needs to remain from work until his symptoms have improved.   Veronika Jones PA-C

## 2023-01-26 NOTE — TELEPHONE ENCOUNTER
RN- please call patient and triage for PAXLOVID.     Patient sent E visit, but need more details to see if he qualifies for treatment.     Veronika Jones PA-C    Patient Questionnaire Submission  --------------------------------     Questionnaire: General Concern     Question: Please describe your symptoms.  Answer:   Coughing colored phlegm. Greenish and yellowish.             Positive covid test last Sunday             Achy crampy like  legs from last night and today     Question: Have you had these symptoms before?  Answer:   Yes     Question: How long have you been having these symptoms?  Answer:   For one to four weeks     Question: Please list any medications you are currently taking for this condition.  Answer:   Magnesium             Vitamin d     Question: Please describe any probable cause for these symptoms.   Answer:   I had a really bad cold about 3 to 4 weeks ago that lasted a full week or so. Coughing up thick greenish phlegm.  Then it got better, but then, a week or so later I got covid, now back to coughing up the phlegm.     ~~~~~~~~~~~~~~~~~~~~~~~~~~~~~~~~  Wrap up      Question: Anything else you would like to add?      Answer:

## 2023-07-13 ENCOUNTER — NURSE TRIAGE (OUTPATIENT)
Dept: FAMILY MEDICINE | Facility: CLINIC | Age: 63
End: 2023-07-13
Payer: COMMERCIAL

## 2023-07-13 ENCOUNTER — OFFICE VISIT (OUTPATIENT)
Dept: URGENT CARE | Facility: URGENT CARE | Age: 63
End: 2023-07-13
Payer: COMMERCIAL

## 2023-07-13 ENCOUNTER — ANCILLARY PROCEDURE (OUTPATIENT)
Dept: GENERAL RADIOLOGY | Facility: CLINIC | Age: 63
End: 2023-07-13
Attending: PHYSICIAN ASSISTANT
Payer: COMMERCIAL

## 2023-07-13 VITALS
DIASTOLIC BLOOD PRESSURE: 76 MMHG | WEIGHT: 227.3 LBS | BODY MASS INDEX: 31.7 KG/M2 | TEMPERATURE: 98.1 F | HEART RATE: 68 BPM | RESPIRATION RATE: 16 BRPM | OXYGEN SATURATION: 96 % | SYSTOLIC BLOOD PRESSURE: 139 MMHG

## 2023-07-13 DIAGNOSIS — R91.8 PULMONARY NODULES: ICD-10-CM

## 2023-07-13 DIAGNOSIS — R91.8 PULMONARY NODULES: Primary | ICD-10-CM

## 2023-07-13 DIAGNOSIS — R05.3 CHRONIC COUGH: ICD-10-CM

## 2023-07-13 PROCEDURE — 99213 OFFICE O/P EST LOW 20 MIN: CPT | Performed by: PHYSICIAN ASSISTANT

## 2023-07-13 PROCEDURE — 71046 X-RAY EXAM CHEST 2 VIEWS: CPT | Mod: TC | Performed by: RADIOLOGY

## 2023-07-13 ASSESSMENT — ENCOUNTER SYMPTOMS
MYALGIAS: 0
DIARRHEA: 0
GASTROINTESTINAL NEGATIVE: 1
MUSCULOSKELETAL NEGATIVE: 1
PALPITATIONS: 0
SORE THROAT: 0
ALLERGIC/IMMUNOLOGIC NEGATIVE: 1
CARDIOVASCULAR NEGATIVE: 1
CHEST TIGHTNESS: 0
FREQUENCY: 0
NEUROLOGICAL NEGATIVE: 1
NAUSEA: 0
HEADACHES: 0
WHEEZING: 0
COUGH: 1
FEVER: 0
CONSTITUTIONAL NEGATIVE: 1
HEMATURIA: 0
SHORTNESS OF BREATH: 0
DYSURIA: 0
ABDOMINAL PAIN: 0
VOMITING: 0
CHILLS: 0

## 2023-07-13 NOTE — TELEPHONE ENCOUNTER
Nurse Triage SBAR    Is this a 2nd Level Triage? YES, LICENSED PRACTITIONER REVIEW IS REQUIRED    Situation: cough with blood    Background: had CT scan last year, 6/8/22  IMPRESSION:  1. Technically unchanged 7 mm solid nodule in the posterior right  lower lobe and 6 mm nodule in the lateral left lower lobe (Lung-RADS  category 3). Follow up 6 months  2. No new or enlarging pulmonary nodule since 2/21/2022.  3. Mild paraseptal emphysema.  4. Large hiatal hernia.    Saw pulmonogy at [facility], but then got second opinion at [facility], and they said to follow up in a year instead of 6 months.     Has next scan scheduled at [facility] in mid-August.     Assessment: Somewhat difficult to get assessment over phone.     Patient states cough started a year ago. Off and on.  Coughing up light yellow/green sputum throughout the day. Has seen blood in mucous about 5 times in the last year. Calls today, because blood is more than he has seen before. Describes as red streaks. Denies sob, chest pain. No fever. Notes that he saw dentist today and the put a temporary crown on a back tooth.  Patient wondering if he should have CT scan sooner.     Per central scheduling, no appts today at [facility]. Routing to provider to review and advise.     DEXTER ROMERO RN on 7/13/2023 at 1:53 PM   [facility]      Protocol Recommended Disposition:   See in Office Today    Recommendation: Routing to provider to review and advise.     Does the patient meet one of the following criteria for ADS visit consideration? 16+ years old, with an [facility] PCP     TIP  Providers, please consider if this condition is appropriate for management at one of our Acute and Diagnostic Services sites.     If patient is a good candidate, please use dotphrase <dot>triageresponse and select Refer to ADS to document.     Reason for Disposition    Coughing up yellow or green sputum and present > 5 days    Additional Information    Negative: MODERATE  difficulty breathing (e.g., speaks in phrases, SOB even at rest, pulse 100-120) and still present when not coughing    Negative: Chest pain    Negative: Unclear to triager if the patient is coughing up blood or vomiting blood    Negative: Pregnant or postpartum (from 0 to 6 weeks after delivery)    Negative: Bedridden (e.g., nursing home patient, CVA, chronic illness, recovering from surgery)    Negative: Patient sounds very sick or weak to the triager    Negative: SEVERE difficulty breathing (e.g., struggling for each breath, speaks in single words)    Negative: Chest pain and difficulty breathing    Negative: Bluish (or gray) lips or face now    Negative: Passed out (i.e., lost consciousness, collapsed and was not responding)    Negative: Shock suspected (e.g., cold/pale/clammy skin, too weak to stand, low BP, rapid pulse)    Negative: Difficult to awaken or acting confused (e.g., disoriented, slurred speech)    Negative: Recent chest injury (i.e., past 24 hours)    Negative: Coughed up blood and large amount (Such as: 'a half cup of blood')    Negative: Sounds like a life-threatening emergency to the triager    Negative: History of prior 'blood clot' in leg or lungs (i.e., deep vein thrombosis, pulmonary embolism)    Negative: History of inherited increased risk of blood clots (e.g., Factor 5 Leiden, Anti-thrombin 3, Protein C or Protein S deficiency, Prothrombin mutation)    Negative: Hip or leg fracture (broken bone) in past month (or had cast on leg or ankle in past month)    Negative: Long-distance travel in past month (e.g., car, bus, train, plane; with trip lasting 6 or more hours)    Negative: MILD difficulty breathing (e.g., minimal/no SOB at rest, SOB with walking, pulse <100) and still present when not coughing (Exception: No change from usual, chronic shortness of breath.)    Negative: Coughed up blood and > 1 tablespoon (15 ml)  (Exception: Blood-tinged sputum.)    Negative: Fever > 103 F (39.4 C)     Negative: Fever > 101 F (38.3 C) and age > 60 years    Negative: Fever > 100.0 F (37.8 C) and diabetes mellitus or weak immune system (e.g., HIV positive, cancer chemo, splenectomy, organ transplant, chronic steroids)    Negative: Has underlying lung disease (e.g., COPD, chronic bronchitis or emphysema) and sputum has turned yellow or green in color    Negative: Coughing up gerardo-colored sputum    Protocols used: COUGHING UP BLOOD-A-OH

## 2023-07-13 NOTE — TELEPHONE ENCOUNTER
Patient returned call to clinic, writer relayed provider/RN's response. Patient states understanding and will proceed to [facility], no further questions.    SUAD HobsonN RN  [facility]

## 2023-07-13 NOTE — TELEPHONE ENCOUNTER
Not a second level triage since disposition is to be seen in office today and urgent is an option (open).  See Triage protocol: UC advised if coughing up blood and it is less than than 1 TBS or for gerardo colored sputum.  Checked providers schedules at  and  and no openings today.  Called patient. Left voice message to return call at [phone].     Ping Santiago RN   [facility]

## 2023-07-13 NOTE — TELEPHONE ENCOUNTER
Recommend being seen in urgent care - no availability in this clinic tomorrow, would suggest being seen before Monday to have a physical exam and possible imaging studies completed or ordered.     Marjorie Sagastume PA-C

## 2023-07-13 NOTE — PROGRESS NOTES
Chief Complaint:     Chief Complaint   Patient presents with    Cough     Coughing up phlegm, with some blood. Intermittent for 1 year.        No results found for any visits on 07/13/23.    Medical Decision Making:    Vital signs reviewed by Raul Owens PA-C  /76 (BP Location: Left arm, Patient Position: Sitting, Cuff Size: Adult Regular)   Pulse 68   Temp 98.1  F (36.7  C) (Tympanic)   Resp 16   Wt 103.1 kg (227 lb 4.8 oz)   SpO2 96%   BMI 31.70 kg/m            ASSESSMENT    1. Pulmonary nodules    2. Chronic cough        PLAN    Patient is in no acute distress.    Temp is 98.1 in clinic today, lung sounds were clear, and O2 sats at 96% on RA.    CXR was negative for any acute pneumothorax, infiltrates or consolidations per my read.    Follow up with your PCP with any further concerns.  Worrisome symptoms discussed with instructions to go to the ED.  Patient verbalized understanding and agreed with this plan.    Labs:    No results found for any visits on 07/13/23.     Vital signs reviewed by Raul Owens PA-C  /76 (BP Location: Left arm, Patient Position: Sitting, Cuff Size: Adult Regular)   Pulse 68   Temp 98.1  F (36.7  C) (Tympanic)   Resp 16   Wt 103.1 kg (227 lb 4.8 oz)   SpO2 96%   BMI 31.70 kg/m      Current Meds      Current Outpatient Medications:     benzonatate (TESSALON) 200 MG capsule, Take 1 capsule (200 mg) by mouth 3 times daily as needed for cough (Patient not taking: Reported on 7/13/2023), Disp: 30 capsule, Rfl: 0    EPINEPHrine (ANY BX GENERIC EQUIV) 0.3 MG/0.3ML injection 2-pack, INJECT 0.3 ML IN THE MUSCLE ONCE FOR 1 DOSE AS DIRECTED (Patient not taking: Reported on 7/13/2023), Disp: , Rfl:     fluticasone (FLONASE) 50 MCG/ACT nasal spray, 1-2 sprays each nostril daily (Patient not taking: Reported on 7/13/2023), Disp: 16 g, Rfl: 11    omeprazole (PRILOSEC) 20 MG DR capsule, Take 1 capsule (20 mg) by mouth daily (Patient not taking: Reported on 7/13/2023),  Uses infrequently. A couple of times a year. Refill provided today.   Disp: 30 capsule, Rfl: 1    tadalafil (CIALIS) 5 MG tablet, TAKE ONE TABLET BY MOUTH EVERY DAY, MAY TAKE AN ADDITIONAL 1-3 TABLETS ONE HOUR PRIOR TO SEXUAL ACTIVITY (Patient not taking: Reported on 7/13/2023), Disp: , Rfl:             SUBJECTIVE    HPI: Jon Yun is an 63 year old male who presents with ongoing cough, and coughing up small amount of blood.  Patient is being followed by pulmonology for lung nodules.  He is scheduled or another CT in August.  Patient was advised to come to  because he thinks that he is coughing up more blood than before.  No chest pain, or SOB.  No fever, chills.      Patient denies any recent travel or exposure to known COVID positive tested individual.      ROS:     Review of Systems   Constitutional: Negative.  Negative for chills and fever.   HENT: Negative.  Negative for sore throat.    Respiratory:  Positive for cough. Negative for chest tightness, shortness of breath and wheezing.    Cardiovascular: Negative.  Negative for chest pain and palpitations.   Gastrointestinal: Negative.  Negative for abdominal pain, diarrhea, nausea and vomiting.   Genitourinary:  Negative for dysuria, frequency, hematuria and urgency.   Musculoskeletal: Negative.  Negative for myalgias.   Skin:  Negative for rash.   Allergic/Immunologic: Negative.  Negative for immunocompromised state.   Neurological: Negative.  Negative for headaches.         Family History   Family History   Problem Relation Age of Onset    Thyroid Disease Mother     Cancer Father 74        Lung cancer    Other Cancer Father     Depression Father     Cardiovascular Maternal Grandmother         Pacemaker age 67    Cancer Maternal Grandfather     Colon Cancer Maternal Grandfather         Problem history  Patient Active Problem List   Diagnosis    Gastritis, acute    External hemorrhoids    Cough    Chest pressure    Gastric ulcer    Hiatal hernia    CARDIOVASCULAR SCREENING; LDL GOAL LESS THAN 130    History of depression     Pulmonary nodules    Screening for HIV (human immunodeficiency virus)    Need for hepatitis C screening test        Allergies  Allergies   Allergen Reactions    Bees Swelling        Social History  Social History     Socioeconomic History    Marital status:      Spouse name: Mary    Number of children: 3    Years of education: 13    Highest education level: Not on file   Occupational History    Occupation: delivers mail     Employer:  [employer]   Tobacco Use    Smoking status: Former     Years: 25.00     Types: Cigarettes     Quit date: 1998     Years since quittin.5    Smokeless tobacco: Never   Substance and Sexual Activity    Alcohol use: Not Currently     Comment: occ    Drug use: No    Sexual activity: Yes     Partners: Female   Other Topics Concern    Parent/sibling w/ CABG, MI or angioplasty before 65F 55M? No   Social History Narrative    Lives at home with his 3 kids and wife.      Social Determinants of Health     Financial Resource Strain: Not on file   Food Insecurity: Not on file   Transportation Needs: Not on file   Physical Activity: Not on file   Stress: Not on file   Social Connections: Not on file   Intimate Partner Violence: Not on file   Housing Stability: Not on file        OBJECTIVE     Vital signs reviewed by Raul Owens PA-C  /76 (BP Location: Left arm, Patient Position: Sitting, Cuff Size: Adult Regular)   Pulse 68   Temp 98.1  F (36.7  C) (Tympanic)   Resp 16   Wt 103.1 kg (227 lb 4.8 oz)   SpO2 96%   BMI 31.70 kg/m       Physical Exam  Vitals reviewed.   Constitutional:       General: He is not in acute distress.     Appearance: He is well-developed. He is not ill-appearing, toxic-appearing or diaphoretic.   HENT:      Head: Normocephalic and atraumatic.      Right Ear: Hearing, tympanic membrane, ear canal and external ear normal. No drainage, swelling or tenderness. Tympanic membrane is not perforated, erythematous, retracted or bulging.       Left Ear: Hearing, tympanic membrane, ear canal and external ear normal. No drainage, swelling or tenderness. Tympanic membrane is not perforated, erythematous, retracted or bulging.      Nose: No nasal tenderness, mucosal edema, congestion or rhinorrhea.      Right Turbinates: Not enlarged or swollen.      Left Turbinates: Not enlarged or swollen.      Right Sinus: No maxillary sinus tenderness or frontal sinus tenderness.      Left Sinus: No maxillary sinus tenderness or frontal sinus tenderness.      Mouth/Throat:      Pharynx: No pharyngeal swelling, oropharyngeal exudate, posterior oropharyngeal erythema or uvula swelling.      Tonsils: No tonsillar exudate. 0 on the right. 0 on the left.   Eyes:      General: Lids are normal.         Right eye: No discharge.         Left eye: No discharge.      Conjunctiva/sclera: Conjunctivae normal.      Right eye: Right conjunctiva is not injected. No exudate.     Left eye: Left conjunctiva is not injected. No exudate.     Pupils: Pupils are equal, round, and reactive to light.   Cardiovascular:      Rate and Rhythm: Normal rate and regular rhythm.      Heart sounds: Normal heart sounds. No murmur heard.     No friction rub. No gallop.   Pulmonary:      Effort: Pulmonary effort is normal. No accessory muscle usage, respiratory distress or retractions.      Breath sounds: Normal breath sounds and air entry. No stridor, decreased air movement or transmitted upper airway sounds. No decreased breath sounds, wheezing, rhonchi or rales.   Chest:      Chest wall: No tenderness.   Abdominal:      General: Bowel sounds are normal. There is no distension.      Palpations: Abdomen is soft. Abdomen is not rigid. There is no mass.      Tenderness: There is no abdominal tenderness. There is no guarding or rebound.   Musculoskeletal:         General: Normal range of motion.      Cervical back: Normal range of motion and neck supple.   Lymphadenopathy:      Head:      Right side of head:  No submental, submandibular, tonsillar, preauricular or posterior auricular adenopathy.      Left side of head: No submental, submandibular, tonsillar, preauricular or posterior auricular adenopathy.      Cervical:      Right cervical: No superficial or posterior cervical adenopathy.     Left cervical: No superficial or posterior cervical adenopathy.   Skin:     General: Skin is warm.      Capillary Refill: Capillary refill takes less than 2 seconds.   Neurological:      Mental Status: He is alert and oriented to person, place, and time.      Cranial Nerves: No cranial nerve deficit.      Sensory: No sensory deficit.      Motor: No abnormal muscle tone.      Coordination: Coordination normal.      Deep Tendon Reflexes: Reflexes normal.   Psychiatric:         Behavior: Behavior normal. Behavior is cooperative.         Thought Content: Thought content normal.         Judgment: Judgment normal.           Raul Owens PA-C  7/13/2023, 6:19 PM

## 2023-11-07 ENCOUNTER — IMMUNIZATION (OUTPATIENT)
Dept: NURSING | Facility: CLINIC | Age: 63
End: 2023-11-07
Payer: COMMERCIAL

## 2023-11-07 PROCEDURE — 91320 SARSCV2 VAC 30MCG TRS-SUC IM: CPT

## 2023-11-07 PROCEDURE — 90471 IMMUNIZATION ADMIN: CPT

## 2023-11-07 PROCEDURE — 90480 ADMN SARSCOV2 VAC 1/ONLY CMP: CPT

## 2023-11-07 PROCEDURE — 90686 IIV4 VACC NO PRSV 0.5 ML IM: CPT

## 2023-11-15 ENCOUNTER — MYC MEDICAL ADVICE (OUTPATIENT)
Dept: FAMILY MEDICINE | Facility: CLINIC | Age: 63
End: 2023-11-15

## 2023-11-15 ENCOUNTER — ALLIED HEALTH/NURSE VISIT (OUTPATIENT)
Dept: FAMILY MEDICINE | Facility: CLINIC | Age: 63
End: 2023-11-15
Payer: COMMERCIAL

## 2023-11-15 DIAGNOSIS — Z23 ENCOUNTER FOR IMMUNIZATION: Primary | ICD-10-CM

## 2023-11-15 PROCEDURE — 90750 HZV VACC RECOMBINANT IM: CPT

## 2023-11-15 PROCEDURE — 90471 IMMUNIZATION ADMIN: CPT

## 2023-11-22 ENCOUNTER — OFFICE VISIT (OUTPATIENT)
Dept: FAMILY MEDICINE | Facility: CLINIC | Age: 63
End: 2023-11-22
Payer: COMMERCIAL

## 2023-11-22 VITALS
DIASTOLIC BLOOD PRESSURE: 63 MMHG | TEMPERATURE: 97.7 F | RESPIRATION RATE: 16 BRPM | BODY MASS INDEX: 32.34 KG/M2 | SYSTOLIC BLOOD PRESSURE: 126 MMHG | WEIGHT: 231 LBS | HEART RATE: 55 BPM | OXYGEN SATURATION: 99 % | HEIGHT: 71 IN

## 2023-11-22 DIAGNOSIS — R19.5 CHANGE IN CONSISTENCY OF STOOL: ICD-10-CM

## 2023-11-22 DIAGNOSIS — K44.9 HIATAL HERNIA: ICD-10-CM

## 2023-11-22 DIAGNOSIS — R10.84 ABDOMINAL PAIN, GENERALIZED: ICD-10-CM

## 2023-11-22 DIAGNOSIS — Z12.5 SCREENING FOR PROSTATE CANCER: ICD-10-CM

## 2023-11-22 DIAGNOSIS — K21.9 GASTROESOPHAGEAL REFLUX DISEASE, UNSPECIFIED WHETHER ESOPHAGITIS PRESENT: Primary | ICD-10-CM

## 2023-11-22 DIAGNOSIS — T80.90XA INJECTION SITE REACTION, INITIAL ENCOUNTER: ICD-10-CM

## 2023-11-22 DIAGNOSIS — R73.01 IMPAIRED FASTING GLUCOSE: ICD-10-CM

## 2023-11-22 DIAGNOSIS — R53.83 FATIGUE, UNSPECIFIED TYPE: ICD-10-CM

## 2023-11-22 LAB
BASOPHILS # BLD AUTO: 0.1 10E3/UL (ref 0–0.2)
BASOPHILS NFR BLD AUTO: 1 %
EOSINOPHIL # BLD AUTO: 0.1 10E3/UL (ref 0–0.7)
EOSINOPHIL NFR BLD AUTO: 3 %
ERYTHROCYTE [DISTWIDTH] IN BLOOD BY AUTOMATED COUNT: 13 % (ref 10–15)
HBA1C MFR BLD: 5.6 % (ref 0–5.6)
HCT VFR BLD AUTO: 46.4 % (ref 40–53)
HGB BLD-MCNC: 15.2 G/DL (ref 13.3–17.7)
IMM GRANULOCYTES # BLD: 0 10E3/UL
IMM GRANULOCYTES NFR BLD: 0 %
LYMPHOCYTES # BLD AUTO: 2.3 10E3/UL (ref 0.8–5.3)
LYMPHOCYTES NFR BLD AUTO: 42 %
MCH RBC QN AUTO: 29.6 PG (ref 26.5–33)
MCHC RBC AUTO-ENTMCNC: 32.8 G/DL (ref 31.5–36.5)
MCV RBC AUTO: 90 FL (ref 78–100)
MONOCYTES # BLD AUTO: 0.4 10E3/UL (ref 0–1.3)
MONOCYTES NFR BLD AUTO: 7 %
NEUTROPHILS # BLD AUTO: 2.6 10E3/UL (ref 1.6–8.3)
NEUTROPHILS NFR BLD AUTO: 48 %
PLATELET # BLD AUTO: 224 10E3/UL (ref 150–450)
RBC # BLD AUTO: 5.14 10E6/UL (ref 4.4–5.9)
WBC # BLD AUTO: 5.5 10E3/UL (ref 4–11)

## 2023-11-22 PROCEDURE — 36415 COLL VENOUS BLD VENIPUNCTURE: CPT | Performed by: FAMILY MEDICINE

## 2023-11-22 PROCEDURE — 85025 COMPLETE CBC W/AUTO DIFF WBC: CPT | Performed by: FAMILY MEDICINE

## 2023-11-22 PROCEDURE — 83690 ASSAY OF LIPASE: CPT | Performed by: FAMILY MEDICINE

## 2023-11-22 PROCEDURE — 84443 ASSAY THYROID STIM HORMONE: CPT | Performed by: FAMILY MEDICINE

## 2023-11-22 PROCEDURE — 80053 COMPREHEN METABOLIC PANEL: CPT | Performed by: FAMILY MEDICINE

## 2023-11-22 PROCEDURE — G0103 PSA SCREENING: HCPCS | Performed by: FAMILY MEDICINE

## 2023-11-22 PROCEDURE — 83036 HEMOGLOBIN GLYCOSYLATED A1C: CPT | Performed by: FAMILY MEDICINE

## 2023-11-22 PROCEDURE — 99214 OFFICE O/P EST MOD 30 MIN: CPT | Performed by: FAMILY MEDICINE

## 2023-11-22 NOTE — PROGRESS NOTES
"      Antonio Reyes is a 63 year old, presenting for the following health issues:  Follow Up (Lab work)    History of Present Illness       Reason for visit:  Pancreas concern  Symptom onset:  More than a month  Symptoms include:  Unusual stools  Symptom intensity:  Mild  Symptom progression:  Staying the same  Had these symptoms before:  Yes  Has tried/received treatment for these symptoms:  No  What makes it worse:  No  What makes it better:  No    He eats 2-3 servings of fruits and vegetables daily.He consumes 0 sweetened beverage(s) daily.He exercises with enough effort to increase his heart rate 60 or more minutes per day.  He exercises with enough effort to increase his heart rate 5 days per week.   He is taking medications regularly.             Review of Systems   Had barium swallow at [place] about 1 1/2 months ago    Not much acid taste    Stools oily  And smelly    Has not been taking the omeprazole    Some coughing randomly, more noticeable after eatingn    Going to [place] in a week          Objective    /63 (BP Location: Left arm, Patient Position: Sitting, Cuff Size: Adult Large)   Pulse 55   Temp 97.7  F (36.5  C)   Ht 1.803 m (5' 11\")   Wt 104.8 kg (231 lb)   SpO2 99%   BMI 32.22 kg/m    Body mass index is 32.22 kg/m .  Physical Exam        Full physical not done     Mentation and affect are fine    No tremor of speech or extremity    Abd soft nontender    Has several cm round area of induration / mild erythema right upper outer arm, where he had injection very recently       Reviewed lab and procedure and consult notes in chart from here and [facility] [place] etc    ASSESSMENT / PLAN:  (K21.9) Gastroesophageal reflux disease, unspecified whether esophagitis present  (primary encounter diagnosis)  Comment: discussed in detail with patient.   He does have a large hiatal hernia which is getting larger but not much symptoms.  Reasonable to have another egd and a GI consult before getting procedure.  " He could also consider a 2nd opionion from general surgery.   Plan: Adult GI  Referral - Procedure Only,         Adult GI  Referral - Consult Only             (K44.9) Hiatal hernia  Comment: as above   Plan: Adult GI  Referral - Procedure Only,         Adult GI  Referral - Consult Only             (R19.5) Change in consistency of stool  Comment: good to see GI   Plan: Adult GI  Referral - Consult Only             (R10.84) Abdominal pain, generalized  Comment: check for pancreatitis   Plan: Lipase             (Z12.5) Screening for prostate cancer  Comment: psa   Plan: Prostate Specific Antigen Screen             (R53.83) Fatigue, unspecified type  Comment: check   Plan: CBC with Platelets & Differential,         Comprehensive metabolic panel, TSH with free T4        reflex             (R73.01) Impaired fasting glucose  Comment: recheck today   Plan: Hemoglobin A1c        Patient states wt is up some     (T80.90XA) Injection site reaction, initial encounter  Comment: mile injection site rxn; okay to monitor   Plan: as above       I reviewed the patient's medications, allergies, medical history, family history, and social history.    Bacilio Mckoy MD

## 2023-11-22 NOTE — PATIENT INSTRUCTIONS
Schedule upper endoscopy EGD    Schedule GI consult    Keep working on healthy diet/exercise and  weight loss

## 2023-11-23 LAB
ALBUMIN SERPL BCG-MCNC: 4.2 G/DL (ref 3.5–5.2)
ALP SERPL-CCNC: 51 U/L (ref 40–150)
ALT SERPL W P-5'-P-CCNC: 24 U/L (ref 0–70)
ANION GAP SERPL CALCULATED.3IONS-SCNC: 9 MMOL/L (ref 7–15)
AST SERPL W P-5'-P-CCNC: 22 U/L (ref 0–45)
BILIRUB SERPL-MCNC: 0.5 MG/DL
BUN SERPL-MCNC: 13.8 MG/DL (ref 8–23)
CALCIUM SERPL-MCNC: 9.1 MG/DL (ref 8.8–10.2)
CHLORIDE SERPL-SCNC: 106 MMOL/L (ref 98–107)
CREAT SERPL-MCNC: 0.76 MG/DL (ref 0.67–1.17)
DEPRECATED HCO3 PLAS-SCNC: 25 MMOL/L (ref 22–29)
EGFRCR SERPLBLD CKD-EPI 2021: >90 ML/MIN/1.73M2
GLUCOSE SERPL-MCNC: 91 MG/DL (ref 70–99)
LIPASE SERPL-CCNC: 16 U/L (ref 13–60)
POTASSIUM SERPL-SCNC: 4.2 MMOL/L (ref 3.4–5.3)
PROT SERPL-MCNC: 6.5 G/DL (ref 6.4–8.3)
PSA SERPL DL<=0.01 NG/ML-MCNC: 1.07 NG/ML (ref 0–4.5)
SODIUM SERPL-SCNC: 140 MMOL/L (ref 135–145)
TSH SERPL DL<=0.005 MIU/L-ACNC: 0.77 UIU/ML (ref 0.3–4.2)

## 2023-11-24 ENCOUNTER — PATIENT OUTREACH (OUTPATIENT)
Dept: ONCOLOGY | Facility: CLINIC | Age: 63
End: 2023-11-24
Payer: COMMERCIAL

## 2023-11-24 ENCOUNTER — TRANSCRIBE ORDERS (OUTPATIENT)
Dept: ONCOLOGY | Facility: CLINIC | Age: 63
End: 2023-11-24
Payer: COMMERCIAL

## 2023-11-24 DIAGNOSIS — K44.9 HIATAL HERNIA: Primary | ICD-10-CM

## 2023-11-24 NOTE — PROGRESS NOTES
New Patient Thoracic Surgery Nurse Navigator Note     Referring provider:   Bacilio Mckoy MD  Family Medicine     Referring Clinic/Organization: [facility]      Referred to (specialty): Thoracic Surgery    Requested provider (if applicable): n/a     Date Referral Received: 11/24/2023     Evaluation for : hiatal hernia     Clinical History (per Nurse review of records provided):    **BOOK MARKED**   08/23/23:  CT Chest without IV Contrast   [facility]  Impression    1.  Stable pulmonary nodules.   2.  Large hiatal hernia has increased in size and now contains the stomach and body and tail of the   pancreas.  Narrative    EXAM: CT CHEST WITHOUT IV CONTRAST     COMPARISON: CT chest 06/28/2022 and 2/21/22.     FINDINGS:   No change in multiple bilateral solid pulmonary nodules, measuring up to 8 mm in the right lower   lobe (series 3, image 342).     Large hiatal hernia has progressed in size and now contains the majority of the stomach as well as   the pancreatic body and tail. Associated compressive atelectasis and some scarring in the medial   right lower lobe. Mild apical predominant pulmonary emphysema. Subcentimeter mediastinal and hilar   lymph nodes, measuring up to 9 mm in short axis diameter.     Mild gynecomastia. Mild aortic arch calcifications. Hypertrophic degenerative changes of the spine   with mild anterior wedging of multiple midthoracic vertebral bodies.     Records Location (Care Everywhere, Media, etc.): [facility] ([place])     RECORDS NEEDED:  Last FIVE years ALL imaging pushed to PACS from [facility]----thank you!!        Additional testing needed prior to consult: NONE

## 2023-11-27 ENCOUNTER — PRE VISIT (OUTPATIENT)
Dept: ONCOLOGY | Facility: CLINIC | Age: 63
End: 2023-11-27
Payer: COMMERCIAL

## 2023-11-27 NOTE — TELEPHONE ENCOUNTER
RECORDS STATUS - ALL OTHER DIAGNOSIS    Referring Provider/Location:  Internal  Dx and Code: Hiatal hernia [K44.9]   Appt Date:  12.19.23  Provider: Magy  Action Taken  Date/Description  TJ     Pre Visit December 7, 2023  3:06 PM   Call to Tiffany at [facility] - she is sending CT and FL over now     Imaging Received  December 8, 2023  1:05 PM  SHAMIR   Action: Images from [facility] received and resolved to PACS.     RECORDS RECEIVED FROM: [facility]- Last 5 years of ALL scans   Appt Date: TBD NN WQ    Action    Action Taken 11/27/2023 8:06AM MICK ARMANDO faxed a request for imaging to [facility] ([place] Fax)       NOTES STATUS DETAILS   OFFICE NOTE from referring provider     OFFICE NOTE from medical oncologist     DISCHARGE SUMMARY from hospital     DISCHARGE REPORT from the ER     OPERATIVE REPORT     MEDICATION LIST     CLINICAL TRIAL TREATMENTS TO DATE     LABS     PATHOLOGY REPORTS     ANYTHING RELATED TO DIAGNOSIS     GENONOMIC TESTING     TYPE:     IMAGING (NEED IMAGES & REPORT)     CT SCANS Requested- [facility]  CT Chest 8/23/2023    FL Esophagram  Requested- [facility]  10/4/2023    MAMMO     ULTRASOUND     PET

## 2023-11-29 ENCOUNTER — TELEPHONE (OUTPATIENT)
Dept: GASTROENTEROLOGY | Facility: CLINIC | Age: 63
End: 2023-11-29

## 2023-11-29 NOTE — TELEPHONE ENCOUNTER
"Endoscopy Scheduling Screen    Have you had a positive Covid test in the last 14 days?  No    Are you active on MyChart?   Yes    What insurance is in the chart?  Other:  [facility]    Ordering/Referring Provider:   DB VALENZUELA        (If ordering provider performs procedure, schedule with ordering provider unless otherwise instructed. )    BMI: Estimated body mass index is 32.22 kg/m  as calculated from the following:    Height as of 11/22/23: 1.803 m (5' 11\").    Weight as of 11/22/23: 104.8 kg (231 lb).     Sedation Ordered  moderate sedation.   If patient BMI > 50 do not schedule in [facility].    If patient BMI > 45 do not schedule at [facility].    Are you taking methadone or Suboxone?  No    Are you taking any prescription medications for pain 3 or more times per week?   No    Do you have a history of malignant hyperthermia or adverse reaction to anesthesia?  No     Have you been diagnosed or told you have pulmonary hypertension?   No    Do you have an LVAD?  No    Have you been told you have moderate to severe sleep apnea?  No    Have you been told you have COPD, asthma, or any other lung disease?  No    Do you have any heart conditions?  No     Have you ever had an organ transplant?   No    Have you ever had or are you awaiting a heart or lung transplant?   No    Have you had a stroke or transient ischemic attack (TIA aka \"mini stroke\" in the last 6 months?   No    Have you been diagnosed with or been told you have cirrhosis of the liver?   No    Are you currently on dialysis?   No    Do you need assistance transferring?   No    BMI: Estimated body mass index is 32.22 kg/m  as calculated from the following:    Height as of 11/22/23: 1.803 m (5' 11\").    Weight as of 11/22/23: 104.8 kg (231 lb).     Is patients BMI > 40 and scheduling location [facility]?  No    Do you take an injectable medication for weight loss or diabetes (excluding insulin)?  No    Do you take the medication Naltrexone?  No    Do you take blood " thinners?  No       Prep   Are you currently on dialysis or do you have chronic kidney disease?  No    Do you have a diagnosis of diabetes?  No    Do you have a diagnosis of cystic fibrosis (CF)?  No    On a regular basis do you go 3 -5 days between bowel movements?  No    BMI > 40?  No    Preferred Pharmacy:    [id number] - [place], [place] - [address] [facility] [address]  [facility] [address]  Phone: [phone] Fax: [phone]      Final Scheduling Details   Colonoscopy prep sent?  N/A    Procedure scheduled  Upper endoscopy (EGD)    Surgeon:  IVETH     Date of procedure:  01/30/2024     Pre-OP / PAC:   No - Not required for this site.    Location  [facility] - [facility] - Patient preference.    Sedation   Moderate Sedation - Per order.      Patient Reminders:   You will receive a call from a Nurse to review instructions and health history.  This assessment must be completed prior to your procedure.  Failure to complete the Nurse assessment may result in the procedure being cancelled.      On the day of your procedure, please designate an adult(s) who can drive you home stay with you for the next 24 hours. The medicines used in the exam will make you sleepy. You will not be able to drive.      You cannot take public transportation, ride share services, or non-medical taxi service without a responsible caregiver.  Medical transport services are allowed with the requirement that a responsible caregiver will receive you at your destination.  We require that drivers and caregivers are confirmed prior to your procedure.   Admission Reconciliation is Not Complete  Discharge Reconciliation is Completed Admission Reconciliation is Completed  Discharge Reconciliation is Completed

## 2023-12-13 ENCOUNTER — OFFICE VISIT (OUTPATIENT)
Dept: FAMILY MEDICINE | Facility: CLINIC | Age: 63
End: 2023-12-13
Payer: COMMERCIAL

## 2023-12-13 VITALS
BODY MASS INDEX: 32.9 KG/M2 | HEART RATE: 92 BPM | RESPIRATION RATE: 14 BRPM | WEIGHT: 235 LBS | SYSTOLIC BLOOD PRESSURE: 124 MMHG | TEMPERATURE: 97.2 F | DIASTOLIC BLOOD PRESSURE: 79 MMHG | HEIGHT: 71 IN | OXYGEN SATURATION: 98 %

## 2023-12-13 DIAGNOSIS — L98.9 SKIN LESIONS: Primary | ICD-10-CM

## 2023-12-13 DIAGNOSIS — K44.9 HIATAL HERNIA: ICD-10-CM

## 2023-12-13 DIAGNOSIS — L71.9 ROSACEA: ICD-10-CM

## 2023-12-13 PROCEDURE — 99213 OFFICE O/P EST LOW 20 MIN: CPT | Performed by: FAMILY MEDICINE

## 2023-12-13 ASSESSMENT — PAIN SCALES - GENERAL: PAINLEVEL: NO PAIN (0)

## 2023-12-13 NOTE — PROGRESS NOTES
"      Antonio Reyes is a 63 year old, presenting for the following health issues:  Derm Problem (Check mole on scalp)        12/13/2023     5:16 PM   Additional Questions   Roomed by Farhana Shi       History of Present Illness       Reason for visit:  Check mole    He eats 2-3 servings of fruits and vegetables daily.He consumes 0 sweetened beverage(s) daily.He exercises with enough effort to increase his heart rate 60 or more minutes per day.  He exercises with enough effort to increase his heart rate 5 days per week.   He is taking medications regularly.             Review of Systems   Lesion on top of head changing    Some significant sun exposure over the years    He is going to see a couple specialists for the hiatal hernia    Not taking the ppi now          Objective    /79 (BP Location: Right arm, Patient Position: Chair, Cuff Size: Adult Large)   Pulse 92   Temp 97.2  F (36.2  C) (Temporal)   Resp 14   Ht 1.803 m (5' 11\")   Wt 106.6 kg (235 lb)   SpO2 98%   BMI 32.78 kg/m    Body mass index is 32.78 kg/m .  Physical Exam     Full physical not done     Mentation and affect are fine    No tremor of speech or extremity    Patient has a very dark purple mole on top of head, left of midline  Less than one cm but changing per patient    He has rosacea changes on inferior nose    ASSESSMENT / PLAN:  (L98.9) Skin lesions  (primary encounter diagnosis)  Comment: prudent to have overall skin check from dermatology.  Patient to  schedule.   Plan: Adult Dermatology  Referral             (L71.9) Rosacea  Comment: trial of this on nose, try for several weeks at least daily   Plan: metroNIDAZOLE (METROCREAM) 0.75 % external         cream             (K44.9) Hiatal hernia  Comment: patient having specialty opinions on this soon  Plan: as above       I reviewed the patient's medications, allergies, medical history, family history, and social history.    Bacilio Mckoy MD                "

## 2023-12-13 NOTE — PATIENT INSTRUCTIONS
Call for dermatology consult; if long wait can with insurance to see what other dermatology clinics area available/ covered    Could start metronidazole cream on nose    Continue to use sun protection

## 2024-01-06 ENCOUNTER — HEALTH MAINTENANCE LETTER (OUTPATIENT)
Age: 64
End: 2024-01-06

## 2024-01-15 ENCOUNTER — TELEPHONE (OUTPATIENT)
Dept: GASTROENTEROLOGY | Facility: CLINIC | Age: 64
End: 2024-01-15
Payer: COMMERCIAL

## 2024-01-15 NOTE — TELEPHONE ENCOUNTER
Pre visit planning completed.      Procedure details:    Patient scheduled for Upper endoscopy (EGD) on 01.30.2024.     Arrival time: 1145. Procedure time 1230    Pre op exam needed? N/A    Facility location: [facility]; [address], [place] [address]    Sedation type: Conscious sedation     Indication for procedure:    Gastroesophageal reflux disease, unspecified whether esophagitis present   Hiatal hernia         Chart review:     Electronic implanted devices? No    Recent diagnosis of diverticulitis within the last 6 weeks? No    Diabetic? No    Diabetic medication HOLDING recommendations: (if applicable)  Oral diabetic medications: N/A  Diabetic injectables: N/A  Insulin: N/A      Medication review:    Anticoagulants? No    NSAIDS? No NSAID medications per patient's medication list.  RN will verify with pre-assessment call.    Other medication HOLDING recommendations:  N/A      Prep for procedure:     Prep instructions sent via [facility]      Garima Ochoa RN  Endoscopy Procedure Pre Assessment RN  [phone] option 4

## 2024-01-15 NOTE — TELEPHONE ENCOUNTER
Called the Pt to complete Pre-Assessment. First Attempt. No answer, Left message.     Sushila Johnson RN   Endoscopy Procedure Pre Assessment RN

## 2024-01-22 ENCOUNTER — ALLIED HEALTH/NURSE VISIT (OUTPATIENT)
Dept: FAMILY MEDICINE | Facility: CLINIC | Age: 64
End: 2024-01-22
Payer: COMMERCIAL

## 2024-01-22 DIAGNOSIS — Z23 NEED FOR VACCINATION: Primary | ICD-10-CM

## 2024-01-22 PROCEDURE — 99207 PR NO CHARGE NURSE ONLY: CPT

## 2024-01-22 PROCEDURE — 90471 IMMUNIZATION ADMIN: CPT

## 2024-01-22 PROCEDURE — 90750 HZV VACC RECOMBINANT IM: CPT

## 2024-01-22 NOTE — TELEPHONE ENCOUNTER
Second call attempt to complete pre assessment.     No answer.  Left message to return call to [phone] #4 by next business day prior to 4PM or procedure will be sent to cancel.     Additional information needed?  N/A      Corinne Kliber, RN  Endoscopy Procedure Pre Assessment RN

## 2024-01-23 NOTE — TELEPHONE ENCOUNTER
Pre assessment completed for upcoming procedure.   (Please see previous telephone encounter notes for complete details)    Patient  returned call.       Procedure details:    Arrival time and facility location reviewed.    Pre op exam needed? N/A    Designated  policy reviewed. Instructed to have someone stay 6 hours post procedure.     COVID policy reviewed.      Medication review:    Medications reviewed. Please see supporting documentation below. Holding recommendations discussed (if applicable).       Prep for procedure:     Procedure prep instructions reviewed.        Any additional information needed:  N/A      Patient  verbalized understanding and had no questions or concerns at this time.      Anneliese Mullins RN  Endoscopy Procedure Pre Assessment RN  [phone] option 4

## 2024-01-30 ENCOUNTER — HOSPITAL ENCOUNTER (OUTPATIENT)
Facility: AMBULATORY SURGERY CENTER | Age: 64
Discharge: HOME | End: 2024-01-30
Attending: SURGERY | Admitting: SURGERY
Payer: COMMERCIAL

## 2024-01-30 VITALS
TEMPERATURE: 97.7 F | RESPIRATION RATE: 16 BRPM | HEART RATE: 62 BPM | OXYGEN SATURATION: 96 % | DIASTOLIC BLOOD PRESSURE: 79 MMHG | SYSTOLIC BLOOD PRESSURE: 125 MMHG

## 2024-01-30 LAB — UPPER GI ENDOSCOPY: NORMAL

## 2024-01-30 PROCEDURE — 88305 TISSUE EXAM BY PATHOLOGIST: CPT | Performed by: PATHOLOGY

## 2024-01-30 PROCEDURE — G8907 PT DOC NO EVENTS ON DISCHARG: HCPCS

## 2024-01-30 PROCEDURE — G8918 PT W/O PREOP ORDER IV AB PRO: HCPCS

## 2024-01-30 PROCEDURE — 43239 EGD BIOPSY SINGLE/MULTIPLE: CPT

## 2024-01-30 RX ORDER — ONDANSETRON 4 MG/1
4 TABLET, ORALLY DISINTEGRATING ORAL EVERY 6 HOURS PRN
Status: CANCELLED | OUTPATIENT
Start: 2024-01-30

## 2024-01-30 RX ORDER — ONDANSETRON 2 MG/ML
4 INJECTION INTRAMUSCULAR; INTRAVENOUS
Status: DISCONTINUED | OUTPATIENT
Start: 2024-01-30 | End: 2024-01-31 | Stop reason: HOSPADM

## 2024-01-30 RX ORDER — LIDOCAINE 40 MG/G
CREAM TOPICAL
Status: DISCONTINUED | OUTPATIENT
Start: 2024-01-30 | End: 2024-01-31 | Stop reason: HOSPADM

## 2024-01-30 RX ORDER — ONDANSETRON 2 MG/ML
4 INJECTION INTRAMUSCULAR; INTRAVENOUS EVERY 6 HOURS PRN
Status: CANCELLED | OUTPATIENT
Start: 2024-01-30

## 2024-01-30 RX ORDER — NALOXONE HYDROCHLORIDE 0.4 MG/ML
0.2 INJECTION, SOLUTION INTRAMUSCULAR; INTRAVENOUS; SUBCUTANEOUS
Status: CANCELLED | OUTPATIENT
Start: 2024-01-30

## 2024-01-30 RX ORDER — NALOXONE HYDROCHLORIDE 0.4 MG/ML
0.4 INJECTION, SOLUTION INTRAMUSCULAR; INTRAVENOUS; SUBCUTANEOUS
Status: CANCELLED | OUTPATIENT
Start: 2024-01-30

## 2024-01-30 RX ORDER — PROCHLORPERAZINE MALEATE 10 MG
10 TABLET ORAL EVERY 6 HOURS PRN
Status: CANCELLED | OUTPATIENT
Start: 2024-01-30

## 2024-01-30 RX ORDER — FLUMAZENIL 0.1 MG/ML
0.2 INJECTION, SOLUTION INTRAVENOUS
Status: CANCELLED | OUTPATIENT
Start: 2024-01-30 | End: 2024-01-31

## 2024-01-30 RX ORDER — FENTANYL CITRATE 50 UG/ML
INJECTION, SOLUTION INTRAMUSCULAR; INTRAVENOUS PRN
Status: DISCONTINUED | OUTPATIENT
Start: 2024-01-30 | End: 2024-01-30 | Stop reason: HOSPADM

## 2024-01-30 NOTE — H&P
Pre-Endoscopy History and Physical     Jon Yun MRN# [id number]   YOB: 1960 Age: 63 year old     Date of Procedure: 1/30/2024  Primary care provider: Bacilio Mckoy  Type of Endoscopy: esophagogastroduodenoscopy (upper GI endoscopy)  Reason for Procedure: hiatal hernia  Type of Anesthesia Anticipated: Moderate Sedation    HPI:    Jon is a 63 year old male who will be undergoing the above procedure.    Known hiatal hernia from CT chest and EGD longer ago, has been looking into possible repair  Denies much heartburn symptoms, not needing antacid medications currently    A history and physical has been performed. The patient's medications and allergies have been reviewed. The risks and benefits of the procedure and the sedation options and risks were discussed with the patient.  All questions were answered and informed consent was obtained.      He denies a personal or family history of anesthesia complications or bleeding disorders.     Allergies   Allergen Reactions    Bees Swelling        Cannot display prior to admission medications because the patient has not been admitted in this contact.     Current Outpatient Medications   Medication    fluticasone (FLONASE) 50 MCG/ACT nasal spray    metroNIDAZOLE (METROCREAM) 0.75 % external cream    tadalafil (CIALIS) 5 MG tablet    EPINEPHrine (ANY BX GENERIC EQUIV) 0.3 MG/0.3ML injection 2-pack     Current Facility-Administered Medications   Medication    lidocaine (LMX4) kit    lidocaine 1 % 0.1-1 mL    ondansetron (ZOFRAN) injection 4 mg    sodium chloride (PF) 0.9% PF flush 3 mL    sodium chloride (PF) 0.9% PF flush 3 mL         Patient Active Problem List   Diagnosis    Gastritis, acute    External hemorrhoids    Cough    Chest pressure    Gastric ulcer    Hiatal hernia    CARDIOVASCULAR SCREENING; LDL GOAL LESS THAN 130    History of depression    Pulmonary nodules    Screening for HIV (human immunodeficiency virus)    Need for hepatitis C  screening test        Past Medical History:   Diagnosis Date    Chest pressure     Cough 2009    External hemorrhoids 2009    Gastritis; acute 2009    GERD (gastroesophageal reflux disease) 2009    Hiatal hernia 10/01/2009    Mild major depression (H24) 2009        Past Surgical History:   Procedure Laterality Date    COLONOSCOPY      COLONOSCOPY WITH CO2 INSUFFLATION N/A 03/15/2022    Procedure: COLONOSCOPY, WITH CO2 INSUFFLATION;  Surgeon: Raul Keen DO;  Location: [facility]    COMBINED ESOPHAGOSCOPY, GASTROSCOPY, DUODENOSCOPY (EGD) WITH CO2 INSUFFLATION N/A 2022    Procedure: ESOPHAGOGASTRODUODENOSCOPY, WITH CO2 INSUFFLATION;  Surgeon: Radha Pierre DO;  Location: [facility]    COSMETIC SURGERY  1982    Nose reconstruction    ENDOSCOPY UPPER, COLONOSCOPY, COMBINED  10/23/2012    Procedure: COMBINED ENDOSCOPY UPPER, COLONOSCOPY;  EGD, COLONOSCOPY, GERD, HISTORY OF CANCER, polypectomy in colon, biopsies of antrum & esophagus;  Surgeon: Corbin Slauhgter MD;  Location: [facility]    HC RECONSTR NOSE+SOULEYMANE SEPTAL REPAIR      ZZC OPEN RX ANKLE DISLOCATN         Social History     Tobacco Use    Smoking status: Former     Years: 25     Types: Cigarettes     Quit date: 1998     Years since quittin.0     Passive exposure: Never    Smokeless tobacco: Never   Substance Use Topics    Alcohol use: Not Currently     Comment: occ       Family History   Problem Relation Age of Onset    Thyroid Disease Mother     Cancer Father 74        Lung cancer    Other Cancer Father     Depression Father     Cardiovascular Maternal Grandmother         Pacemaker age 67    Cancer Maternal Grandfather     Colon Cancer Maternal Grandfather        REVIEW OF SYSTEMS:     5 point ROS negative except as noted above in HPI, including Gen., Resp., CV, GI &  system review.      PHYSICAL EXAM:   /84   Pulse 59   Temp 97  F (36.1  C) (Temporal)   Resp 18   SpO2 97%  Estimated body mass index  "is 32.78 kg/m  as calculated from the following:    Height as of 12/13/23: 1.803 m (5' 11\").    Weight as of 12/13/23: 106.6 kg (235 lb).   GENERAL APPEARANCE: healthy, alert, and no distress  MENTAL STATUS: alert  AIRWAY EXAM: Mallampatti Class III (visualization of the soft palate and base of uvula)  RESP: lungs clear to auscultation - no rales, rhonchi or wheezes  CV: regular rates and rhythm      DIAGNOSTICS:    Not indicated      IMPRESSION   ASA Class 2 - Mild systemic disease        PLAN:       Plan for esophagogastroduodenoscopy (upper GI endoscopy). We discussed the risks, benefits and alternatives and the patient wished to proceed.    The above has been forwarded to the consulting provider.      Signed Electronically by: Len Sullivan MD  January 30, 2024    "

## 2024-02-01 LAB
PATH REPORT.COMMENTS IMP SPEC: NORMAL
PATH REPORT.COMMENTS IMP SPEC: NORMAL
PATH REPORT.FINAL DX SPEC: NORMAL
PATH REPORT.GROSS SPEC: NORMAL
PATH REPORT.MICROSCOPIC SPEC OTHER STN: NORMAL
PATH REPORT.RELEVANT HX SPEC: NORMAL
PHOTO IMAGE: NORMAL

## 2024-02-29 ENCOUNTER — OFFICE VISIT (OUTPATIENT)
Dept: GASTROENTEROLOGY | Facility: CLINIC | Age: 64
End: 2024-02-29
Payer: COMMERCIAL

## 2024-02-29 VITALS
HEIGHT: 71 IN | BODY MASS INDEX: 34.59 KG/M2 | HEART RATE: 76 BPM | WEIGHT: 247.1 LBS | OXYGEN SATURATION: 96 % | SYSTOLIC BLOOD PRESSURE: 136 MMHG | DIASTOLIC BLOOD PRESSURE: 78 MMHG

## 2024-02-29 DIAGNOSIS — K29.40 GASTRIC ATROPHY: Primary | ICD-10-CM

## 2024-02-29 DIAGNOSIS — K44.9 PARAESOPHAGEAL HERNIA: ICD-10-CM

## 2024-02-29 LAB
BASOPHILS # BLD AUTO: 0.1 10E3/UL (ref 0–0.2)
BASOPHILS NFR BLD AUTO: 1 %
EOSINOPHIL # BLD AUTO: 0.2 10E3/UL (ref 0–0.7)
EOSINOPHIL NFR BLD AUTO: 3 %
ERYTHROCYTE [DISTWIDTH] IN BLOOD BY AUTOMATED COUNT: 13.8 % (ref 10–15)
FERRITIN SERPL-MCNC: 94 NG/ML (ref 31–409)
HCT VFR BLD AUTO: 47.8 % (ref 40–53)
HGB BLD-MCNC: 15.8 G/DL (ref 13.3–17.7)
IMM GRANULOCYTES # BLD: 0 10E3/UL
IMM GRANULOCYTES NFR BLD: 0 %
IRON BINDING CAPACITY (ROCHE): 338 UG/DL (ref 240–430)
IRON SATN MFR SERPL: 36 % (ref 15–46)
IRON SERPL-MCNC: 123 UG/DL (ref 61–157)
LYMPHOCYTES # BLD AUTO: 2.3 10E3/UL (ref 0–5.3)
LYMPHOCYTES NFR BLD AUTO: 29 %
MCH RBC QN AUTO: 29.6 PG (ref 26.5–33)
MCHC RBC AUTO-ENTMCNC: 33.1 G/DL (ref 31.5–36.5)
MCV RBC AUTO: 90 FL (ref 78–100)
MONOCYTES # BLD AUTO: 0.7 10E3/UL (ref 0–1.3)
MONOCYTES NFR BLD AUTO: 8 %
NEUTROPHILS # BLD AUTO: 4.8 10E3/UL (ref 1.6–8.3)
NEUTROPHILS NFR BLD AUTO: 59 %
NRBC # BLD AUTO: 0 10E3/UL
NRBC BLD AUTO-RTO: 0 /100
PLATELET # BLD AUTO: 244 10E3/UL (ref 150–450)
RBC # BLD AUTO: 5.33 10E6/UL (ref 4.4–5.9)
WBC # BLD AUTO: 8.1 10E3/UL (ref 4–11)

## 2024-02-29 PROCEDURE — 83540 ASSAY OF IRON: CPT | Performed by: PHYSICIAN ASSISTANT

## 2024-02-29 PROCEDURE — 82607 VITAMIN B-12: CPT | Performed by: PHYSICIAN ASSISTANT

## 2024-02-29 PROCEDURE — 83550 IRON BINDING TEST: CPT | Performed by: PHYSICIAN ASSISTANT

## 2024-02-29 PROCEDURE — 82728 ASSAY OF FERRITIN: CPT | Performed by: PHYSICIAN ASSISTANT

## 2024-02-29 PROCEDURE — 36415 COLL VENOUS BLD VENIPUNCTURE: CPT | Performed by: PHYSICIAN ASSISTANT

## 2024-02-29 PROCEDURE — 85025 COMPLETE CBC W/AUTO DIFF WBC: CPT | Performed by: PHYSICIAN ASSISTANT

## 2024-02-29 PROCEDURE — 99204 OFFICE O/P NEW MOD 45 MIN: CPT | Performed by: PHYSICIAN ASSISTANT

## 2024-02-29 ASSESSMENT — PAIN SCALES - GENERAL: PAINLEVEL: NO PAIN (0)

## 2024-02-29 NOTE — NURSING NOTE
"Chief Complaint   Patient presents with    New Patient     New consult for GERD, hiatal hernia and change in consistency of stool.     He requests these members of his care team be copied on today's visit information:  PCP:   Bac[facility]ckoy [facility]  [address] [facility] [place] [address]    Vitals:    02/29/24 1558   BP: 136/78   BP Location: Left arm   Patient Position: Sitting   Cuff Size: Adult Regular   Pulse: 76   SpO2: 96%   Weight: 112.1 kg (247 lb 1.6 oz)   Height: 1.803 m (5' 11\")     Body mass index is 34.46 kg/m .    Medications were reconciled.        Rosangela Esteban CMA    "

## 2024-02-29 NOTE — PATIENT INSTRUCTIONS
It was nice to meet you today.  To review, we talked about your recent upper endoscopy.  This confirmed the already known finding of paraesophageal hiatal hernia.  We had a good conversation about this and pursuing surgery.  In patients that are experiencing symptoms (chest or epigastric pain, fullness after meals, nausea, vomiting, abdominal bloating, shortness of breath, or gastrointestinal bleeding), surgical intervention is recommended.  In patients without symptoms, pursuing surgery is more of a gray area.  The argument for doing it it would be to prevent gastric volvulus, or twisting of the stomach, which can be a surgical emergency.  The overall risk of this happening is low, but not 0.     We also talked about the findings in your stomach on the recent endoscopy.  The tissue appeared to be atrophic.  This can occur over time and is often seen in patients that have had H. pylori infection in the past.  The biopsies did not show any significant formation, which is reassuring.  We are going to do some blood tests today to ensure that your iron and B12 levels are normal.  In patients with gastric atrophy, sometimes the absorption of these nutrients goes down.  I will be in touch with the results when I see them.    You are welcome to follow-up with me as needed going forward.

## 2024-02-29 NOTE — LETTER
2/29/2024         RE: Jon Yun  [address] [address]        Dear Colleague,    Thank you for referring your patient, Jon Yun, to the [facility]. Please see a copy of my visit note below.    NEW PATIENT GI CLINIC VISIT    CC/REFERRING MD:  Bacilio Mckoy  REASON FOR CONSULTATION:   Bacilio Mckoy for   Chief Complaint   Patient presents with     New Patient     New consult for GERD, hiatal hernia and change in consistency of stool.       ASSESSMENT/PLAN: Patient is here for evaluation regarding his known type IV paraesophageal hiatal hernia and following up from his recent EGD.  We first talked about the EGD findings in the stomach.  There appeared to be some gastric atrophy.  The biopsies looked essentially normal.  We talked about atrophic gastritis for a bit.  He does have a remote history of H. pylori that led to a gastric ulcer.  We will check his iron and B12 studies today to ensure there is no evidence of malabsorption.  I would recommend these levels being checked routinely, but I do not think he needs surveillance endoscopy unless these levels change.    We talked about the large hiatal hernia as well.  As of now, it does not seem like he is having any clear symptoms.  We did review that there is a risk for gastric volvulus with a hiatal hernia of the size and that elective repair could prevent this from occurring.  He has been recommended to have the surgery by both the [facility] thoracic surgeon and by Dr. Sullivan.  I encouraged the patient to either reach out to his [facility] surgeon again for further specific questions regarding the surgery itself and expected recovery time.  If he is looking for a second opinion from our health system, we can arrange that as well.  He will let me know.  For now, I do not see any indication to start any new medication for this, but we did discuss following up with me if he developed any digestive  distress symptoms.    1. Gastric atrophy  - Vitamin B12; Future  - Iron and iron binding capacity; Future  - Ferritin; Future  - CBC with platelets and differential; Future  - Vitamin B12  - Iron and iron binding capacity  - Ferritin  - CBC with platelets and differential    2. Paraesophageal hernia        RTC PRN    Thank you for this consultation.  It was a pleasure to participate in the care of this patient; please contact us with any further questions.      35 minutes spent on the date of the encounter doing chart review, patient visit, and documentation    This note was created with voice recognition software, and while reviewed for accuracy, typos may remain.     Mikhail Khan PA-C  Division of Gastroenterology, Hepatology and Nutrition  [facility]  Jon Yun is a 63 year old male with a past medical history significant for pulmonary nodules that is seen as a new patient in the GI clinic today for evaluation of paraesophageal hiatal hernia.  To review, a large, paraesophageal hiatal hernia was identified on upper endoscopy in May 2022.  This was done in the workup of GERD symptoms.  This had also been seen in CT chest for evaluation of lung nodules that year.  Over time, this has grown,-measured at 10 cm on most recent EGD through our office and noted to be a type IV hiatal hernia on esophagram in October.  He has been seen by thoracic surgery through the [facility] and they have offered hiatal hernia repair.  His family practice doctor asked him to see gastroenterology for further opinion on this.    As of now, he is not having any dysphagia, odynophagia, early satiety, chest pain, reflux, liquid or solid regurgitation, bloating, belching, or gas.  His weight has been stable.  He is not on any sort of antacid medication.  There has been no evidence of GI blood loss.  He has concerns about pursuing surgery and wondering if it is needed.    ROS:    10  point ROS neg other than the symptoms noted above in the HPI.    PREVIOUS ENDOSCOPY:  Reviewed, see HPI    PERTINENT RELEVANT IMAGING OR LABS:  Reviewed    ALLERGIES:     Allergies   Allergen Reactions     Bees Swelling       PERTINENT MEDICATIONS:    Current Outpatient Medications:      EPINEPHrine (ANY BX GENERIC EQUIV) 0.3 MG/0.3ML injection 2-pack, , Disp: , Rfl:      fluticasone (FLONASE) 50 MCG/ACT nasal spray, 1-2 sprays each nostril daily, Disp: 16 g, Rfl: 11     metroNIDAZOLE (METROCREAM) 0.75 % external cream, Apply topically 2 times daily as needed (rosacea), Disp: 45 g, Rfl: 1     tadalafil (CIALIS) 5 MG tablet, , Disp: , Rfl:     PROBLEM LIST  Patient Active Problem List    Diagnosis Date Noted     Screening for HIV (human immunodeficiency virus) 09/15/2022     Priority: Medium     Need for hepatitis C screening test 09/15/2022     Priority: Medium     Pulmonary nodules 02/22/2022     Priority: Medium     Referred to [facility] by [facility] Results Team.       History of depression 01/08/2016     Priority: Medium     CARDIOVASCULAR SCREENING; LDL GOAL LESS THAN 130 09/27/2012     Priority: Medium     Chest pressure 10/06/2009     Priority: Medium     Gastric ulcer 10/06/2009     Priority: Medium     Hiatal hernia 10/01/2009     Priority: Medium     Gastritis, acute 08/13/2009     Priority: Medium     (Problem list name updated by automated process. Provider to review and confirm.)       External hemorrhoids 08/13/2009     Priority: Medium     Cough 08/13/2009     Priority: Medium       PERTINENT PAST MEDICAL HISTORY:  Past Medical History:   Diagnosis Date     Chest pressure      Cough 08/13/2009     External hemorrhoids 08/13/2009     Gastritis; acute 08/13/2009     GERD (gastroesophageal reflux disease) 08/13/2009     Hiatal hernia 10/01/2009     Mild major depression (H24) 08/13/2009       PREVIOUS SURGERIES:  Past Surgical History:   Procedure Laterality Date      COLONOSCOPY       COLONOSCOPY WITH CO2 INSUFFLATION N/A 03/15/2022    Procedure: COLONOSCOPY, WITH CO2 INSUFFLATION;  Surgeon: Raul Keen DO;  Location: [facility]     COMBINED ESOPHAGOSCOPY, GASTROSCOPY, DUODENOSCOPY (EGD) WITH CO2 INSUFFLATION N/A 2022    Procedure: ESOPHAGOGASTRODUODENOSCOPY, WITH CO2 INSUFFLATION;  Surgeon: Radha Pierre DO;  Location: [facility]     COMBINED ESOPHAGOSCOPY, GASTROSCOPY, DUODENOSCOPY (EGD) WITH CO2 INSUFFLATION N/A 2024    Procedure: Combined Esophagoscopy, Gastroscopy, Duodenoscopy (Egd) With Co2 Insufflation;  Surgeon: Len Sullivan MD;  Location: [facility]     COSMETIC SURGERY  1982    Nose reconstruction     ENDOSCOPY UPPER, COLONOSCOPY, COMBINED  10/23/2012    Procedure: COMBINED ENDOSCOPY UPPER, COLONOSCOPY;  EGD, COLONOSCOPY, GERD, HISTORY OF CANCER, polypectomy in colon, biopsies of antrum & esophagus;  Surgeon: Corbin Slaughter MD;  Location: [facility]     ESOPHAGOSCOPY, GASTROSCOPY, DUODENOSCOPY (EGD), COMBINED N/A 2024    Procedure: ESOPHAGOGASTRODUODENOSCOPY, WITH BIOPSY;  Surgeon: Len Sullivan MD;  Location: [facility]     HC RECONSTR NOSE+SOULEYMANE SEPTAL REPAIR       ZZC OPEN RX ANKLE DISLOCATN         SOCIAL HISTORY:  Social History     Socioeconomic History     Marital status:      Spouse name: Mary     Number of children: 3     Years of education: 13     Highest education level: Not on file   Occupational History     Occupation: delivers mail     Employer:  [employer]   Tobacco Use     Smoking status: Former     Years: 25     Types: Cigarettes     Quit date: 1998     Years since quittin.1     Passive exposure: Never     Smokeless tobacco: Never   Vaping Use     Vaping Use: Never used   Substance and Sexual Activity     Alcohol use: Not Currently     Comment: occ     Drug use: No     Sexual activity: Yes     Partners: Female   Other Topics Concern     Parent/sibling w/ CABG, MI or angioplasty before 65F  55M? No   Social History Narrative    Lives at home with his 3 kids and wife.      Social Determinants of Health     Financial Resource Strain: Low Risk  (11/22/2023)    Financial Resource Strain      Within the past 12 months, have you or your family members you live with been unable to get utilities (heat, electricity) when it was really needed?: No   Food Insecurity: Low Risk  (11/22/2023)    Food Insecurity      Within the past 12 months, did you worry that your food would run out before you got money to buy more?: No      Within the past 12 months, did the food you bought just not last and you didn t have money to get more?: No   Transportation Needs: Low Risk  (11/22/2023)    Transportation Needs      Within the past 12 months, has lack of transportation kept you from medical appointments, getting your medicines, non-medical meetings or appointments, work, or from getting things that you need?: No   Physical Activity: Not on file   Stress: Not on file   Social Connections: Not on file   Interpersonal Safety: Low Risk  (11/22/2023)    Interpersonal Safety      Do you feel physically and emotionally safe where you currently live?: Yes      Within the past 12 months, have you been hit, slapped, kicked or otherwise physically hurt by someone?: No      Within the past 12 months, have you been humiliated or emotionally abused in other ways by your partner or ex-partner?: No   Housing Stability: Low Risk  (11/22/2023)    Housing Stability      Do you have housing? : Yes      Are you worried about losing your housing?: No       FAMILY HISTORY:  Family History   Problem Relation Age of Onset     Thyroid Disease Mother      Cancer Father 74        Lung cancer     Other Cancer Father      Depression Father      Cardiovascular Maternal Grandmother         Pacemaker age 67     Cancer Maternal Grandfather      Colon Cancer Maternal Grandfather        Past/family/social history reviewed and no changes    PHYSICAL  "EXAMINATION:  Constitutional: aaox3, cooperative, pleasant, not dyspneic/diaphoretic, no acute distress  Vitals reviewed: /78 (BP Location: Left arm, Patient Position: Sitting, Cuff Size: Adult Regular)   Pulse 76   Ht 1.803 m (5' 11\")   Wt 112.1 kg (247 lb 1.6 oz)   SpO2 96%   BMI 34.46 kg/m    Wt:   Wt Readings from Last 2 Encounters:   02/29/24 112.1 kg (247 lb 1.6 oz)   12/13/23 106.6 kg (235 lb)      Eyes: Sclera anicteric/injected  CV: No edema  Respiratory: Unlabored breathing  Abd: Nondistended, +bs, no hepatosplenomegaly, nontender, no peritoneal signs  Skin: warm, perfused, no jaundice  Psych: Normal affect  MSK: Normal gait                    Again, thank you for allowing me to participate in the care of your patient.        Sincerely,        Mikhail Khan PA-C  "

## 2024-03-01 LAB — VIT B12 SERPL-MCNC: 572 PG/ML (ref 232–1245)

## 2024-03-04 NOTE — PROGRESS NOTES
NEW PATIENT GI CLINIC VISIT    CC/REFERRING MD:  Bacilio Mckoy  REASON FOR CONSULTATION:   Bacilio Mckoy for   Chief Complaint   Patient presents with    New Patient     New consult for GERD, hiatal hernia and change in consistency of stool.       ASSESSMENT/PLAN: Patient is here for evaluation regarding his known type IV paraesophageal hiatal hernia and following up from his recent EGD.  We first talked about the EGD findings in the stomach.  There appeared to be some gastric atrophy.  The biopsies looked essentially normal.  We talked about atrophic gastritis for a bit.  He does have a remote history of H. pylori that led to a gastric ulcer.  We will check his iron and B12 studies today to ensure there is no evidence of malabsorption.  I would recommend these levels being checked routinely, but I do not think he needs surveillance endoscopy unless these levels change.    We talked about the large hiatal hernia as well.  As of now, it does not seem like he is having any clear symptoms.  We did review that there is a risk for gastric volvulus with a hiatal hernia of the size and that elective repair could prevent this from occurring.  He has been recommended to have the surgery by both the [facility] thoracic surgeon and by Dr. Sullivan.  I encouraged the patient to either reach out to his [facility] surgeon again for further specific questions regarding the surgery itself and expected recovery time.  If he is looking for a second opinion from our health system, we can arrange that as well.  He will let me know.  For now, I do not see any indication to start any new medication for this, but we did discuss following up with me if he developed any digestive distress symptoms.    1. Gastric atrophy  - Vitamin B12; Future  - Iron and iron binding capacity; Future  - Ferritin; Future  - CBC with platelets and differential; Future  - Vitamin B12  - Iron and iron binding capacity  - Ferritin  - CBC with platelets and  differential    2. Paraesophageal hernia        RTC PRN    Thank you for this consultation.  It was a pleasure to participate in the care of this patient; please contact us with any further questions.      35 minutes spent on the date of the encounter doing chart review, patient visit, and documentation    This note was created with voice recognition software, and while reviewed for accuracy, typos may remain.     Mikhail Khan PA-C  Division of Gastroenterology, Hepatology and Nutrition  [facility]  Jon Yun is a 63 year old male with a past medical history significant for pulmonary nodules that is seen as a new patient in the GI clinic today for evaluation of paraesophageal hiatal hernia.  To review, a large, paraesophageal hiatal hernia was identified on upper endoscopy in May 2022.  This was done in the workup of GERD symptoms.  This had also been seen in CT chest for evaluation of lung nodules that year.  Over time, this has grown,-measured at 10 cm on most recent EGD through our office and noted to be a type IV hiatal hernia on esophagram in October.  He has been seen by thoracic surgery through the [facility] and they have offered hiatal hernia repair.  His family practice doctor asked him to see gastroenterology for further opinion on this.    As of now, he is not having any dysphagia, odynophagia, early satiety, chest pain, reflux, liquid or solid regurgitation, bloating, belching, or gas.  His weight has been stable.  He is not on any sort of antacid medication.  There has been no evidence of GI blood loss.  He has concerns about pursuing surgery and wondering if it is needed.    ROS:    10 point ROS neg other than the symptoms noted above in the HPI.    PREVIOUS ENDOSCOPY:  Reviewed, see HPI    PERTINENT RELEVANT IMAGING OR LABS:  Reviewed    ALLERGIES:     Allergies   Allergen Reactions    Bees Swelling       PERTINENT MEDICATIONS:    Current  Outpatient Medications:     EPINEPHrine (ANY BX GENERIC EQUIV) 0.3 MG/0.3ML injection 2-pack, , Disp: , Rfl:     fluticasone (FLONASE) 50 MCG/ACT nasal spray, 1-2 sprays each nostril daily, Disp: 16 g, Rfl: 11    metroNIDAZOLE (METROCREAM) 0.75 % external cream, Apply topically 2 times daily as needed (rosacea), Disp: 45 g, Rfl: 1    tadalafil (CIALIS) 5 MG tablet, , Disp: , Rfl:     PROBLEM LIST  Patient Active Problem List    Diagnosis Date Noted    Screening for HIV (human immunodeficiency virus) 09/15/2022     Priority: Medium    Need for hepatitis C screening test 09/15/2022     Priority: Medium    Pulmonary nodules 02/22/2022     Priority: Medium     Referred to [facility] by [facility] Results Team.      History of depression 01/08/2016     Priority: Medium    CARDIOVASCULAR SCREENING; LDL GOAL LESS THAN 130 09/27/2012     Priority: Medium    Chest pressure 10/06/2009     Priority: Medium    Gastric ulcer 10/06/2009     Priority: Medium    Hiatal hernia 10/01/2009     Priority: Medium    Gastritis, acute 08/13/2009     Priority: Medium     (Problem list name updated by automated process. Provider to review and confirm.)      External hemorrhoids 08/13/2009     Priority: Medium    Cough 08/13/2009     Priority: Medium       PERTINENT PAST MEDICAL HISTORY:  Past Medical History:   Diagnosis Date    Chest pressure     Cough 08/13/2009    External hemorrhoids 08/13/2009    Gastritis; acute 08/13/2009    GERD (gastroesophageal reflux disease) 08/13/2009    Hiatal hernia 10/01/2009    Mild major depression (H24) 08/13/2009       PREVIOUS SURGERIES:  Past Surgical History:   Procedure Laterality Date    COLONOSCOPY      COLONOSCOPY WITH CO2 INSUFFLATION N/A 03/15/2022    Procedure: COLONOSCOPY, WITH CO2 INSUFFLATION;  Surgeon: Raul Keen DO;  Location: [facility] OR    COMBINED ESOPHAGOSCOPY, GASTROSCOPY, DUODENOSCOPY (EGD) WITH CO2 INSUFFLATION N/A 05/26/2022    Procedure:  ESOPHAGOGASTRODUODENOSCOPY, WITH CO2 INSUFFLATION;  Surgeon: Radha Pierre DO;  Location: [facility] OR    COMBINED ESOPHAGOSCOPY, GASTROSCOPY, DUODENOSCOPY (EGD) WITH CO2 INSUFFLATION N/A 2024    Procedure: Combined Esophagoscopy, Gastroscopy, Duodenoscopy (Egd) With Co2 Insufflation;  Surgeon: Len Sullivan MD;  Location: [facility] OR    COSMETIC SURGERY  1982    Nose reconstruction    ENDOSCOPY UPPER, COLONOSCOPY, COMBINED  10/23/2012    Procedure: COMBINED ENDOSCOPY UPPER, COLONOSCOPY;  EGD, COLONOSCOPY, GERD, HISTORY OF CANCER, polypectomy in colon, biopsies of antrum & esophagus;  Surgeon: Corbin Slaughter MD;  Location: [facility] OR    ESOPHAGOSCOPY, GASTROSCOPY, DUODENOSCOPY (EGD), COMBINED N/A 2024    Procedure: ESOPHAGOGASTRODUODENOSCOPY, WITH BIOPSY;  Surgeon: Len Sullivan MD;  Location: [facility] OR    HC RECONSTR NOSE+SOULEYMANE SEPTAL REPAIR      ZZC OPEN RX ANKLE DISLOCATN         SOCIAL HISTORY:  Social History     Socioeconomic History    Marital status:      Spouse name: Mary    Number of children: 3    Years of education: 13    Highest education level: Not on file   Occupational History    Occupation: delivers mail     Employer:  [employer]   Tobacco Use    Smoking status: Former     Years: 25     Types: Cigarettes     Quit date: 1998     Years since quittin.1     Passive exposure: Never    Smokeless tobacco: Never   Vaping Use    Vaping Use: Never used   Substance and Sexual Activity    Alcohol use: Not Currently     Comment: occ    Drug use: No    Sexual activity: Yes     Partners: Female   Other Topics Concern    Parent/sibling w/ CABG, MI or angioplasty before 65F 55M? No   Social History Narrative    Lives at home with his 3 kids and wife.      Social Determinants of Health     Financial Resource Strain: Low Risk  (2023)    Financial Resource Strain     Within the past 12 months, have you or your family members you live with been unable to get utilities  "(heat, electricity) when it was really needed?: No   Food Insecurity: Low Risk  (11/22/2023)    Food Insecurity     Within the past 12 months, did you worry that your food would run out before you got money to buy more?: No     Within the past 12 months, did the food you bought just not last and you didn t have money to get more?: No   Transportation Needs: Low Risk  (11/22/2023)    Transportation Needs     Within the past 12 months, has lack of transportation kept you from medical appointments, getting your medicines, non-medical meetings or appointments, work, or from getting things that you need?: No   Physical Activity: Not on file   Stress: Not on file   Social Connections: Not on file   Interpersonal Safety: Low Risk  (11/22/2023)    Interpersonal Safety     Do you feel physically and emotionally safe where you currently live?: Yes     Within the past 12 months, have you been hit, slapped, kicked or otherwise physically hurt by someone?: No     Within the past 12 months, have you been humiliated or emotionally abused in other ways by your partner or ex-partner?: No   Housing Stability: Low Risk  (11/22/2023)    Housing Stability     Do you have housing? : Yes     Are you worried about losing your housing?: No       FAMILY HISTORY:  Family History   Problem Relation Age of Onset    Thyroid Disease Mother     Cancer Father 74        Lung cancer    Other Cancer Father     Depression Father     Cardiovascular Maternal Grandmother         Pacemaker age 67    Cancer Maternal Grandfather     Colon Cancer Maternal Grandfather        Past/family/social history reviewed and no changes    PHYSICAL EXAMINATION:  Constitutional: aaox3, cooperative, pleasant, not dyspneic/diaphoretic, no acute distress  Vitals reviewed: /78 (BP Location: Left arm, Patient Position: Sitting, Cuff Size: Adult Regular)   Pulse 76   Ht 1.803 m (5' 11\")   Wt 112.1 kg (247 lb 1.6 oz)   SpO2 96%   BMI 34.46 kg/m    Wt:   Wt Readings " from Last 2 Encounters:   02/29/24 112.1 kg (247 lb 1.6 oz)   12/13/23 106.6 kg (235 lb)      Eyes: Sclera anicteric/injected  CV: No edema  Respiratory: Unlabored breathing  Abd: Nondistended, +bs, no hepatosplenomegaly, nontender, no peritoneal signs  Skin: warm, perfused, no jaundice  Psych: Normal affect  MSK: Normal gait

## 2024-03-04 NOTE — RESULT ENCOUNTER NOTE
Evan Reyes,    Your recent set of labs are available for you to review.  No evidence of nutritional malabsorption in your stomach at this time.  To review, I had recommended checking these tests to make sure your stomach was absorbing these nutrients adequately, as your stomach tissue showed some signs of atrophy on your recent endoscopy.  I would recommend having these labs checked annually with your primary care doctor, sooner if you show signs of anemia (fatigue, pallor, exertional shortness of breath).     Sincerely,  Mikhail JOHNSON [facility] GI, Hepatology, and Nutrition

## 2024-04-05 ENCOUNTER — OFFICE VISIT (OUTPATIENT)
Dept: URGENT CARE | Facility: URGENT CARE | Age: 64
End: 2024-04-05
Payer: COMMERCIAL

## 2024-04-05 VITALS
SYSTOLIC BLOOD PRESSURE: 128 MMHG | RESPIRATION RATE: 16 BRPM | OXYGEN SATURATION: 95 % | DIASTOLIC BLOOD PRESSURE: 79 MMHG | HEART RATE: 79 BPM | WEIGHT: 247.8 LBS | BODY MASS INDEX: 34.56 KG/M2 | TEMPERATURE: 98.3 F
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
## 2024-04-05 DIAGNOSIS — Z02.6 ENCOUNTER RELATED TO WORKER'S COMPENSATION CLAIM: ICD-10-CM

## 2024-04-05 DIAGNOSIS — S61.451A DOG BITE OF RIGHT HAND, INITIAL ENCOUNTER: Primary | ICD-10-CM

## 2024-04-05 PROCEDURE — 99213 OFFICE O/P EST LOW 20 MIN: CPT | Performed by: STUDENT IN AN ORGANIZED HEALTH CARE EDUCATION/TRAINING PROGRAM

## 2024-04-05 NOTE — PROGRESS NOTES
ASSESSMENT & PLAN:   Diagnoses and all orders for this visit:  Dog bite of right hand, initial encounter  -     amoxicillin-clavulanate (AUGMENTIN) 875-125 MG tablet; Take 1 tablet by mouth 2 times daily for 5 days  Encounter related to worker's compensation claim    Dog bite to right hand last night. Has superficial abrasion and superficial puncture wound with no signs of cellulitis. Prophylactic Augmentin x 5 days. Tetanus UTD.     At the end of the encounter, I discussed results, diagnosis, medications. Discussed red flags for immediate return to clinic/ER, as well as indications for follow up if no improvement. Patient and/or caregiver understood and agreed to plan. Patient was stable for discharge.    There are no Patient Instructions on file for this visit.    ------------------------------------------------------------------------  SUBJECTIVE  History was obtained from patient.    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 Believes dog is UTD on vaccines. Last tdap 08/2021      This was a worker's compensation visit - only information relevant to this worker's compensation injury is included in this note. All medications, allergies, past medical history, and social history was reviewed but is not included in this note.      OBJECTIVE  Vitals:    04/05/24 1858   BP: 128/79   BP Location: Left arm   Patient Position: Sitting   Cuff Size: Adult Large   Pulse: 79   Resp: 16   Temp: 98.3  F (36.8  C)   TempSrc: Oral   SpO2: 95%   Weight: 112.4 kg (247 lb 12.8 oz)     Physical Exam   GENERAL: healthy, alert, no acute distress.   PSYCH: mentation appears normal. Normal affect  SKIN: right hand - superficial abrasion on dorsum of hand middle of 2-3 metacarpal and scabbed  superficial puncture at proximal 4th metacarpal. No surrounding erythema, edema, or drainage.    No results found for any visits on 04/05/24.

## 2024-04-23 ENCOUNTER — OFFICE VISIT (OUTPATIENT)
Dept: DERMATOLOGY | Facility: CLINIC | Age: 64
End: 2024-04-23
Attending: FAMILY MEDICINE
Payer: COMMERCIAL

## 2024-04-23 DIAGNOSIS — L81.5 GUTTATE HYPOMELANOSIS: Primary | ICD-10-CM

## 2024-04-23 DIAGNOSIS — D49.2 NEOPLASM OF UNSPECIFIED BEHAVIOR OF BONE, SOFT TISSUE, AND SKIN: ICD-10-CM

## 2024-04-23 PROCEDURE — 99203 OFFICE O/P NEW LOW 30 MIN: CPT | Mod: 25 | Performed by: PHYSICIAN ASSISTANT

## 2024-04-23 PROCEDURE — 88305 TISSUE EXAM BY PATHOLOGIST: CPT | Mod: 26 | Performed by: DERMATOLOGY

## 2024-04-23 PROCEDURE — 11104 PUNCH BX SKIN SINGLE LESION: CPT | Performed by: PHYSICIAN ASSISTANT

## 2024-04-23 PROCEDURE — 88305 TISSUE EXAM BY PATHOLOGIST: CPT | Mod: TC | Performed by: PHYSICIAN ASSISTANT

## 2024-04-23 ASSESSMENT — PAIN SCALES - GENERAL: PAINLEVEL: NO PAIN (0)

## 2024-04-23 NOTE — PROGRESS NOTES
[facility] Dermatology Note  Encounter Date: Apr 23, 2024  Office Visit     Reviewed patients past medical history and pertinent chart review prior to patients visit today.     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 Punch biopsy today.    Procedure Note: Biopsy by punch technique  The risks and benefits of the procedure were described to the patient. These include but are not limited to bleeding, infection, scar, incomplete removal, and non-diagnostic biopsy. Verbal informed consent was obtained. The left vertex scalp was cleansed with an alcohol pad and injected with lidocaine with epinephrine. Once anesthesia was obtained, a 5 mm punch biopsy was performed. The tissue was placed in a labeled container with formalin and sent to pathology. The site was closed with using 4-0 prolene. Vaseline and a bandage were applied to the wound. The patient tolerated the procedure well and was given post biopsy care instructions.     # Guttate hypomelanosis  - Discussed this is a benign finding. I recommended sunscreen with an SPF of 30 or more and sun protective clothing.       All risks, benefits and alternatives were discussed with patient.  Patient is in agreement and understands the assessment and plan.  All questions were answered.  Lesly Drew PA-C  [facility] Dermatology  _______________________________________    CC: Derm Problem (Eric is here today for a lesion of concern on his left scalp. )    HPI:  Mr. Jon Yun is a(n) 64 year old male who presents today as a new patient for a lesion on the scalp. The lesion has been present for years and has grown over time. No pain, itching, or bleeding at the site. No personal or family history of skin cancer. He also notes  lightened patches on his forearms. Patient is otherwise feeling well, without additional skin concerns.      Physical Exam:  SKIN: Focused examination of scalp was performed.  - The left vertex scalp demonstrates a 0.4 x 0.4 cm dark blue macule with hypopigmented globule.   - Patchy hypopigmented macules involving the bilateral dorsal forearms.     - No other lesions of concern on areas examined.     Medications:  Current Outpatient Medications   Medication Sig Dispense Refill    EPINEPHrine (ANY BX GENERIC EQUIV) 0.3 MG/0.3ML injection 2-pack  (Patient not taking: Reported on 4/5/2024)      fluticasone (FLONASE) 50 MCG/ACT nasal spray 1-2 sprays each nostril daily (Patient not taking: Reported on 4/5/2024) 16 g 11    metroNIDAZOLE (METROCREAM) 0.75 % external cream Apply topically 2 times daily as needed (rosacea) (Patient not taking: Reported on 4/5/2024) 45 g 1    tadalafil (CIALIS) 5 MG tablet  (Patient not taking: Reported on 4/5/2024)       No current facility-administered medications for this visit.      Past Medical History:   Patient Active Problem List   Diagnosis    Gastritis, acute    External hemorrhoids    Cough    Chest pressure    Gastric ulcer    Hiatal hernia    CARDIOVASCULAR SCREENING; LDL GOAL LESS THAN 130    History of depression    Pulmonary nodules    Screening for HIV (human immunodeficiency virus)    Need for hepatitis C screening test     Past Medical History:   Diagnosis Date    Chest pressure     Cough 08/13/2009    External hemorrhoids 08/13/2009    Gastritis; acute 08/13/2009    GERD (gastroesophageal reflux disease) 08/13/2009    Hiatal hernia 10/01/2009    Mild major depression (H24) 08/13/2009       CC Bacilio Mckoy MD  [id number] [facility]  [place] [id number] on close of this encounter.

## 2024-04-23 NOTE — PATIENT INSTRUCTIONS
Patient Education        Proper skin care from [facility]:     -Eliminate harsh soaps as they strip the natural oils from the skin, often resulting in dry itchy skin ( i.e. Dial, Zest, Romanian Spring)  -Use mild soaps such as Cetaphil or Dove Sensitive Skin in the shower. You do not need to use soap on arms, legs, and trunk every time you shower unless visibly soiled.   -Avoid hot or cold showers.  -After showering, lightly dry off and apply moisturizing within 2-3 minutes. This will help trap moisture in the skin.   -Aggressive use of a moisturizer at least 1-2 times a day to the entire body (including -Vanicream, Cetaphil, Aquaphor or Cerave) and moisturize hands after every washing.  -We recommend using moisturizers that come in a tub that needs to be scooped out, not a pump. This has more of an oil base. It will hold moisture in your skin much better than a water base moisturizer. The above recommended are non-pore clogging.        Wear a sunscreen with at least SPF 30 on your face, ears, neck and V of the chest daily. Wear sunscreen on other areas of the body if those areas are exposed to the sun throughout the day. Sunscreens can contain physical and/or chemical blockers. Physical blockers are less likely to clog pores, these include zinc oxide and titanium dioxide. Reapply every two hour and after swimming.      Sunscreen examples: https://www.ewg.org/sunscreen/     UV radiation  UVA radiation remains constant throughout the day and throughout the year. It is a longer wavelength than UVB and therefore penetrates deeper into the skin leading to immediate and delayed tanning, photoaging, and skin cancer. 70-80% of UVA and UVB radiation occurs between the hours of 10am-2pm.  UVB radiation  UVB radiation causes the most harmful effects and is more significant during the summer months. However, snow and ice can reflect UVB radiation leading to skin damage during the winter months as well. UVB radiation is  responsible for tanning, burning, inflammation, delayed erythema (pinkness), pigmentation (brown spots), and skin cancer.      I recommend self monthly full body exams and yearly full body exams with a dermatology provider. If you develop a new or changing lesion please follow up for examination. Most skin cancers are pink and scaly or pink and pearly. However, we do see blue/brown/black skin cancers.  Consider the ABCDEs of melanoma when giving yourself your monthly full body exam ( don't forget the groin, buttocks, feet, toes, etc). A-asymmetry, B-borders, C-color, D-diameter, E-elevation or evolving. If you see any of these changes please follow up in clinic. If you cannot see your back I recommend purchasing a hand held mirror to use with a larger wall mirror.       Checking for Skin Cancer  You can find cancer early by checking your skin each month. There are 3 kinds of skin cancer. They are melanoma, basal cell carcinoma, and squamous cell carcinoma. Doing monthly skin checks is the best way to find new marks or skin changes. Follow the instructions below for checking your skin.   The ABCDEs of checking moles for melanoma   Check your moles or growths for signs of melanoma using ABCDE:   Asymmetry: the sides of the mole or growth don t match  Border: the edges are ragged, notched, or blurred  Color: the color within the mole or growth varies  Diameter: the mole or growth is larger than 6 mm (size of a pencil eraser)  Evolving: the size, shape, or color of the mole or growth is changing (evolving is not shown in the images below)    Checking for other types of skin cancer  Basal cell carcinoma or squamous cell carcinoma have symptoms such as:      A spot or mole that looks different from all other marks on your skin  Changes in how an area feels, such as itching, tenderness, or pain  Changes in the skin's surface, such as oozing, bleeding, or scaliness  A sore that does not heal  New swelling or redness beyond  the border of a mole     Who s at risk?  Anyone can get skin cancer. But you are at greater risk if you have:   Fair skin, light-colored hair, or light-colored eyes  Many moles or abnormal moles on your skin  A history of sunburns from sunlight or tanning beds  A family history of skin cancer  A history of exposure to radiation or chemicals  A weakened immune system  If you have had skin cancer in the past, you are at risk for recurring skin cancer.   How to check your skin  Do your monthly skin checkups in front of a full-length mirror. Check all parts of your body, including your:   Head (ears, face, neck, and scalp)  Torso (front, back, and sides)  Arms (tops, undersides, upper, and lower armpits)  Hands (palms, backs, and fingers, including under the nails)  Buttocks and genitals  Legs (front, back, and sides)  Feet (tops, soles, toes, including under the nails, and between toes)  If you have a lot of moles, take digital photos of them each month. Make sure to take photos both up close and from a distance. These can help you see if any moles change over time.   Most skin changes are not cancer. But if you see any changes in your skin, call your doctor right away. Only he or she can diagnose a problem. If you have skin cancer, seeing your doctor can be the first step toward getting the treatment that could save your life.   [employer] last reviewed this educational content on 4/1/2019 2000-2020 [employer]. [address] [place] [address], [place] [address]. All rights reserved. This information is not intended as a substitute for professional medical care. Always follow your healthcare professional's instructions.        When should I call my doctor?  If you are worsening or not improving, please, contact us or seek urgent care as noted below.      Who should I call with questions (adults)?  [facility] (adult and pediatric): [phone]  [facility]  [place] (adult): [phone]  [facility] ([place], [place], [place] and [place]) [phone]  For urgent needs outside of business hours call the [facility] at [phone] and ask for the dermatology resident on call to be paged  If this is a medical emergency and you are unable to reach an ER, Call 911        If you need a prescription refill, please contact your pharmacy. Refills are approved or denied by our Physicians during normal business hours, Monday through Fridays  Per office policy, refills will not be granted if you have not been seen within the past year (or sooner depending on your child's condition)     Wound Care After a Biopsy    What is a skin biopsy?  A skin biopsy allows the doctor to examine a very small piece of tissue under the microscope to determine the diagnosis and the best treatment for the skin condition. A local anesthetic (numbing medicine) is injected with a very small needle into the skin area to be tested. A small piece of skin is taken from the area. Sometimes a suture (stitch) is used.     What are the risks of a skin biopsy?  I will experience scar, bleeding, swelling, pain, crusting and redness. I may experience incomplete removal or recurrence. Risks of this procedure are excessive bleeding, bruising, infection, nerve damage, numbness, thick (hypertrophic or keloidal) scar and non-diagnostic biopsy.    How should I care for my wound for the first 24 hours?  Keep the wound dry and covered for 24 hours  If it bleeds, hold direct pressure on the area for 15 minutes. If bleeding does not stop, call us or go to the emergency room  Avoid strenuous exercise the first 1-2 days or as your doctor instructs you    How should I care for the wound after 24 hours?  After 24 hours, remove the bandage  You may bathe or shower as normal  If you had a scalp biopsy, you can shampoo as usual and can use shower water to clean the biopsy site  daily  Clean the wound once a day with gentle soap and water  Do not scrub, be gentle  Apply white petroleum/Vaseline after cleaning the wound with a cotton swab or a clean finger, and keep the site covered with a Bandaid /bandage. Bandages are not necessary with a scalp biopsy  If you are unable to cover the site with a Bandaid /bandage, re-apply ointment 2-3 times a day to keep the site moist. Moisture will help with healing  Avoid strenuous activity for first 1-2 days  Avoid lakes, rivers, pools, and oceans until the stitches are removed or the site is healed    How do I clean my wound?  Wash hands thoroughly with soap or use hand  before all wound care  Clean the wound with gentle soap and water  Apply white petroleum/Vaseline  to wound after it is clean  Replace the Bandaid /bandage to keep the wound covered for the first few days or as instructed by your doctor  If you had a scalp biopsy, warm shower water to the area on a daily basis should suffice    What should I use to clean my wound?   Cotton-tipped applicators (Qtips )  White petroleum jelly (Vaseline ). Use a clean new container and use Q-tips to apply.  Bandaids  as needed  Gentle soap     How should I care for my wound long term?  Do not get your wound dirty  Keep up with wound care for one week or until the area is healed.  If you have stitches, stitches need to be removed in 14 days. You may return to our clinic for this or you may have it done locally at your doctor s office.  A small scab will form and fall off by itself when the area is completely healed. The area will be red and will become pink in color as it heals. Sun protection is very important for how your scar will turn out. Sunscreen with an SPF 30 or greater is recommended once the area is healed.  You should have some soreness but it should be mild and slowly go away over several days. Talk to your doctor about using tylenol for pain,    When should I call my doctor?  If you  have increased:   Pain or swelling  Pus or drainage (clear or slightly yellow drainage is ok)  Temperature over 100F  Spreading redness or warmth around wound    When will I hear about my results?  The biopsy results can take 2 weeks to come back.  Your results will automatically release to [facility] before your provider has even reviewed them.  The clinic will call you with the results, send you a [facility] message, or have you schedule a follow-up clinic or phone time to discuss the results.  Contact our clinics if you do not hear from us in 2 weeks.    Who should I call with questions?  [facility]: [phone]  [facility]: [phone]  For urgent needs outside of business hours call the [facility] at [phone] and ask for the dermatology resident on call

## 2024-04-23 NOTE — LETTER
4/23/2024       RE: [address]  [address] [address]     Dear Colleague,    Thank you for referring your patient, Jon Yun, to the [facility] [place] [facility]. Please see a copy of my visit note below.    [facility] Note  Encounter Date: Apr 23, 2024  Office Visit     Reviewed patients past medical history and pertinent chart review prior to patients visit today.     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 Punch biopsy today.    Procedure Note: Biopsy by punch technique  The risks and benefits of the procedure were described to the patient. These include but are not limited to bleeding, infection, scar, incomplete removal, and non-diagnostic biopsy. Verbal informed consent was obtained. The left vertex scalp was cleansed with an alcohol pad and injected with lidocaine with epinephrine. Once anesthesia was obtained, a 5 mm punch biopsy was performed. The tissue was placed in a labeled container with formalin and sent to pathology. The site was closed with using 4-0 prolene. Vaseline and a bandage were applied to the wound. The patient tolerated the procedure well and was given post biopsy care instructions.     # Guttate hypomelanosis  - Discussed this is a benign finding. I recommended sunscreen with an SPF of 30 or more and sun protective clothing.       All risks, benefits and alternatives were discussed with patient.  Patient is in agreement and understands the assessment and plan.  All questions were answered.  Lesly Drew PA-C  [facility]  _______________________________________    CC: Derm Problem (Eric is here today for a  lesion of concern on his left scalp. )    HPI:  Mr. Jon Yun is a(n) 64 year old male who presents today as a new patient for a lesion on the scalp. The lesion has been present for years and has grown over time. No pain, itching, or bleeding at the site. No personal or family history of skin cancer. He also notes lightened patches on his forearms. Patient is otherwise feeling well, without additional skin concerns.      Physical Exam:  SKIN: Focused examination of scalp was performed.  - The left vertex scalp demonstrates a 0.4 x 0.4 cm dark blue macule with hypopigmented globule.   - Patchy hypopigmented macules involving the bilateral dorsal forearms.     - No other lesions of concern on areas examined.     Medications:  Current Outpatient Medications   Medication Sig Dispense Refill    EPINEPHrine (ANY BX GENERIC EQUIV) 0.3 MG/0.3ML injection 2-pack  (Patient not taking: Reported on 4/5/2024)      fluticasone (FLONASE) 50 MCG/ACT nasal spray 1-2 sprays each nostril daily (Patient not taking: Reported on 4/5/2024) 16 g 11    metroNIDAZOLE (METROCREAM) 0.75 % external cream Apply topically 2 times daily as needed (rosacea) (Patient not taking: Reported on 4/5/2024) 45 g 1    tadalafil (CIALIS) 5 MG tablet  (Patient not taking: Reported on 4/5/2024)       No current facility-administered medications for this visit.      Past Medical History:   Patient Active Problem List   Diagnosis    Gastritis, acute    External hemorrhoids    Cough    Chest pressure    Gastric ulcer    Hiatal hernia    CARDIOVASCULAR SCREENING; LDL GOAL LESS THAN 130    History of depression    Pulmonary nodules    Screening for HIV (human immunodeficiency virus)    Need for hepatitis C screening test     Past Medical History:   Diagnosis Date    Chest pressure     Cough 08/13/2009    External hemorrhoids 08/13/2009    Gastritis; acute 08/13/2009    GERD (gastroesophageal reflux disease) 08/13/2009    Hiatal hernia 10/01/2009    Mild major  depression (H24) 08/13/2009       CC Bacilio Mckoy MD  [address] [facility] [place] [address] on close of this encounter.

## 2024-04-25 LAB
PATH REPORT.COMMENTS IMP SPEC: NORMAL
PATH REPORT.COMMENTS IMP SPEC: NORMAL
PATH REPORT.FINAL DX SPEC: NORMAL
PATH REPORT.GROSS SPEC: NORMAL
PATH REPORT.MICROSCOPIC SPEC OTHER STN: NORMAL
PATH REPORT.RELEVANT HX SPEC: NORMAL

## 2024-09-24 NOTE — PATIENT INSTRUCTIONS
Please call Croghan Plummer to schedule your stress echo. The number to call is [phone].   Not Applicable

## 2024-11-01 ENCOUNTER — NURSE TRIAGE (OUTPATIENT)
Dept: NURSING | Facility: CLINIC | Age: 64
End: 2024-11-01
Payer: COMMERCIAL

## 2024-11-01 DIAGNOSIS — U07.1 INFECTION DUE TO 2019 NOVEL CORONAVIRUS: Primary | ICD-10-CM

## 2024-11-02 ENCOUNTER — NURSE TRIAGE (OUTPATIENT)
Dept: NURSING | Facility: CLINIC | Age: 64
End: 2024-11-02
Payer: COMMERCIAL

## 2024-11-02 NOTE — TELEPHONE ENCOUNTER
Jon gave consent to communicate with wife, Neisha.  This evening has nose congestion and fever and took a covid test and it came back positive.  11/1/2024.  Symptoms started today also 11/1/2024.        COVID Positive/Requesting COVID treatment    Patient is positive for COVID and requesting treatment options.    Date of positive COVID test (PCR or at home)? 11/1/2024  Current COVID symptoms: cough, headache, sore throat, and congestion or runny nose temperature of 99.9.  Date COVID symptoms began: 11/1/2024    Message should be routed to clinic RN pool. Best phone number to use for call back: [phone] Variable cell phone.         Reason for Disposition   HIGH RISK patient (e.g., weak immune system, age > 64 years, obesity with BMI of 30 or higher, pregnant, chronic lung disease or other chronic medical condition) and COVID symptoms (e.g., cough, fever)  (Exceptions: Already seen by doctor or NP/PA and no new or worsening symptoms.)    Additional Information   Negative: SEVERE difficulty breathing (e.g., struggling for each breath, speaks in single words)   Negative: Difficult to awaken or acting confused (e.g., disoriented, slurred speech)   Negative: Bluish (or gray) lips or face now   Negative: Shock suspected (e.g., cold/pale/clammy skin, too weak to stand, low BP, rapid pulse)   Negative: Sounds like a life-threatening emergency to the triager   Negative: Diagnosed or suspected COVID-19 and symptoms lasting 3 or more weeks   Negative: COVID-19 exposure and no symptoms   Negative: COVID-19 vaccine reaction suspected (e.g., fever, headache, muscle aches) occurring 1 to 3 days after getting vaccine   Negative: COVID-19 vaccine, questions about   Negative: Lives with someone known to have influenza (flu test positive) and flu-like symptoms (e.g., cough, runny nose, sore throat, SOB; with or without fever)   Negative: Possible COVID-19 symptoms and triager concerned about severity of symptoms or other  causes   Negative: COVID-19 and breastfeeding, questions about   Negative: SEVERE or constant chest pain or pressure  (Exception: Mild central chest pain, present only when coughing.)   Negative: MODERATE difficulty breathing (e.g., speaks in phrases, SOB even at rest, pulse 100-120)   Negative: Headache and stiff neck (can't touch chin to chest)   Negative: Oxygen level (e.g., pulse oximetry) 90% or lower   Negative: Chest pain or pressure  (Exception: MILD central chest pain, present only when coughing.)   Negative: Drinking very little and dehydration suspected (e.g., no urine > 12 hours, very dry mouth, very lightheaded)   Negative: Patient sounds very sick or weak to the triager   Negative: MILD difficulty breathing (e.g., minimal/no SOB at rest, SOB with walking, pulse <100)   Negative: Fever > 103 F (39.4 C)   Negative: Fever > 101 F (38.3 C) and over 60 years of age   Negative: Fever > 100.0 F (37.8 C) and bedridden (e.g., CVA, chronic illness, recovering from surgery)    Protocols used: Coronavirus (COVID-19) Diagnosed or [id number]

## 2024-11-02 NOTE — TELEPHONE ENCOUNTER
Eric gave me consent to speak with wife, Nesiha, caller.    Positive covid test last night.  Asking about paxlovid.      COVID Positive/Requesting COVID treatment    Patient is positive for COVID and requesting treatment options.    Date of positive COVID test (PCR or at home)? 11/1/24  Current COVID symptoms: fever or chills, cough, fatigue, headache, sore throat, and congestion or runny nose  Date COVID symptoms began: yesterday morning    Message should be routed to clinic RN pool. Best phone number to use for call back: [phone].    Patient would like to start Paxlovid if he qualifies, before Monday. I transferred him to scheduling to make a virtual visit appt.        Reason for Disposition   HIGH RISK patient (e.g., weak immune system, age > 64 years, obesity with BMI of 30 or higher, pregnant, chronic lung disease or other chronic medical condition) and COVID symptoms (e.g., cough, fever)  (Exceptions: Already seen by doctor or NP/PA and no new or worsening symptoms.)    Additional Information   Negative: SEVERE difficulty breathing (e.g., struggling for each breath, speaks in single words)   Negative: Difficult to awaken or acting confused (e.g., disoriented, slurred speech)   Negative: Bluish (or gray) lips or face now   Negative: Shock suspected (e.g., cold/pale/clammy skin, too weak to stand, low BP, rapid pulse)   Negative: Sounds like a life-threatening emergency to the triager   Negative: Diagnosed or suspected COVID-19 and symptoms lasting 3 or more weeks   Negative: COVID-19 exposure and no symptoms   Negative: COVID-19 vaccine reaction suspected (e.g., fever, headache, muscle aches) occurring 1 to 3 days after getting vaccine   Negative: COVID-19 vaccine, questions about   Negative: Lives with someone known to have influenza (flu test positive) and flu-like symptoms (e.g., cough, runny nose, sore throat, SOB; with or without fever)   Negative: Possible COVID-19 symptoms and triager concerned  about severity of symptoms or other causes   Negative: COVID-19 and breastfeeding, questions about   Negative: SEVERE or constant chest pain or pressure  (Exception: Mild central chest pain, present only when coughing.)   Negative: MODERATE difficulty breathing (e.g., speaks in phrases, SOB even at rest, pulse 100-120)   Negative: Headache and stiff neck (can't touch chin to chest)   Negative: Oxygen level (e.g., pulse oximetry) 90% or lower   Negative: Chest pain or pressure  (Exception: MILD central chest pain, present only when coughing.)   Negative: Drinking very little and dehydration suspected (e.g., no urine > 12 hours, very dry mouth, very lightheaded)   Negative: Patient sounds very sick or weak to the triager   Negative: MILD difficulty breathing (e.g., minimal/no SOB at rest, SOB with walking, pulse <100)   Negative: Fever > 103 F (39.4 C)   Negative: Fever > 101 F (38.3 C) and over 60 years of age   Negative: Fever > 100.0 F (37.8 C) and bedridden (e.g., CVA, chronic illness, recovering from surgery)    Protocols used: Coronavirus (COVID-19) Diagnosed or [id number] PEREZ RN [facility]

## 2024-11-04 RX ORDER — TADALAFIL 5 MG/1
5 TABLET ORAL DAILY PRN
COMMUNITY
Start: 2024-11-04

## 2024-11-04 NOTE — TELEPHONE ENCOUNTER
Patient and wife calling back.    RN COVID TREATMENT VISIT  11/04/24      The patient has been triaged and does not require a higher level of care.    Jon Yun  64 year old  Current weight? 240 lbs    Has the patient been seen by a primary care provider at an [facility] or [facility] within the past two years? Yes.   Have you been in close proximity to/do you have a known exposure to a person with a confirmed case of influenza? No.     General treatment eligibility:  Date of positive COVID test (PCR or at home)?  11/1/24    Are you or have you been hospitalized for this COVID-19 infection? No.   Have you received monoclonal antibodies or antiviral treatment for COVID-19 since this positive test? No.   Do you have any of the following conditions that place you at risk of being very sick from COVID-19?   - Age 50 years or older  - Mental health disorders including mood disorders, depression, schizophrenia spectrum disorders   Yes, patient has at least one high risk condition as noted above.     Current COVID symptoms:   - cough  - headache  - congestion or runny nose  Yes. Patient has at least one symptom as selected.     How many days since symptoms started? 5 days or less. Established patient, 12 years or older weighing at least 88.2 lbs, who has symptoms that started in the past 5 days, has not been hospitalized nor received treatment already, and is at risk for being very sick from COVID-19.     Treatment eligibility by RN:  Are you currently pregnant or nursing? No  Do you have a clinically significant hypersensitivity to nirmatrelvir or ritonavir, or toxic epidermal necrolysis (TEN) or Collazo-Oziel Syndrome? No  Do you have a history of hepatitis, any hepatic impairment on the Problem List (such as Child-Lim Class C, cirrhosis, fatty liver disease, alcoholic liver disease), or was the last liver lab (hepatic panel, ALT, AST, ALK Phos, bilirubin) elevated in the past 6  months? No  Do you have any history of severe renal impairment (eGFR < 30mL/min)? No    Is patient eligible to continue? Yes, patient meets all eligibility requirements for the RN COVID treatment (as denoted by all no responses above).     Current Outpatient Medications   Medication Sig Dispense Refill    omeprazole (PRILOSEC) 20 MG DR capsule Take 1 capsule (20 mg) by mouth daily.      tadalafil (CIALIS) 5 MG tablet Take 1 tablet (5 mg) by mouth daily as needed.      EPINEPHrine (ANY BX GENERIC EQUIV) 0.3 MG/0.3ML injection 2-pack  (Patient not taking: Reported on 4/5/2024)      fluticasone (FLONASE) 50 MCG/ACT nasal spray 1-2 sprays each nostril daily (Patient not taking: Reported on 4/5/2024) 16 g 11    metroNIDAZOLE (METROCREAM) 0.75 % external cream Apply topically 2 times daily as needed (rosacea) (Patient not taking: Reported on 4/5/2024) 45 g 1    tadalafil (CIALIS) 5 MG tablet  (Patient not taking: Reported on 4/5/2024)         Medications from List 1 of the standing order (on medications that exclude the use of Paxlovid) that patient is taking: NONE. Is patient taking [place]'s Wort? No  Is patient taking Radha's Wort or any meds from List 1? No.   Medications from List 2 of the standing order (on meds that provider needs to adjust) that patient is taking: NONE. Is patient on any of the meds from List 2? No.   Medications from List 3 of standing order (on meds that a RN needs to adjust) that patient is taking: tadalafil (Adcirca, Alyq, Cialis, Tadliq): Is patient using for erectile dysfunction? Yes, instructed patient to stop taking tadalafil while taking Paxlovid and restart tadalafil 3 days after the completion of Paxlovid. Is patient on any meds from List 3? No.     Paxlovid has an approximate 90% reduction in hospitalization. Paxlovid can possibly cause altered sense of taste, diarrhea (loose, watery stools), high blood pressure, muscle aches.     Would patient like a Paxlovid prescription?   Yes.    Lab Results   Component Value Date    GFRESTIMATED >90 11/22/2023       Was last eGFR reduced? No, eGFR 60 or greater/ No Result on record. Patient can receive the normal renal function dose. Paxlovid Rx sent to [facility]   No, using [facility], selected    Temporary change to home medications: tadalafil    All medication adjustments (holds, etc) were discussed with the patient and patient was asked to repeat back (teachback) their med adjustment.  Did patient understand med adjustment? Yes, patient repeated back and understood correctly.        Reviewed the following instructions with the patient:    Paxlovid (nimatrelvir and ritonavir)    How it works  Two medicines (nirmatrelvir and ritonavir) are taken together. They stop the virus from growing. Less amount of virus is easier for your body to fight.    How to take  Medicine comes in a daily container with both medicine tablets. Take by mouth twice daily (once in the morning, once at night) for 5 days.  The number of tablets to take varies by patient.  Don't chew or break capsules. Swallow whole.    When to take  Take as soon as possible after positive COVID-19 test result, and within 5 days of your first symptoms.    Possible side effects  Can cause altered sense of taste, diarrhea (loose, watery stools), high blood pressure, muscle aches.    Discharge Instructions for COVID-19 Patients  You were tested for COVID-19. Your result was positive. This means you do have COVID-19. Follow the steps below. If you were tested for an upcoming treatment (procedure), you should also contact your care team for next steps.  How can I take care of myself at home?  Get lots of rest.  Drink extra fluids (unless a doctor has told you not to).  Take acetaminophen (Tylenol) for fever or pain. If you have liver or kidney problems, first ask your care team if it's safe to take acetaminophen.  Adults can take either:  650 mg (two 325 mg pills) every 4 to 6 hours as needed (but no  more than 10 pills per day), OR   1,000 mg (two 500 mg pills) every 6 hours as needed (but no more than 6 pills per day).  Note: Don't take more than 3,000 mg of acetaminophen (Tylenol) in one day. Acetaminophen is found in many medicines, even over-the-counter medicines. Read all labels to be sure you don't take too much.  For children:  Check the acetaminophen (Tylenol) bottle to find out the right dose based on their age or weight.  Don't give children more than 1,625 mg of Tylenol in one day.  Know when to call 911. Emergency warning signs include:  Trouble breathing or shortness of breath  Pain or pressure in the chest that doesn't go away  Feeling confused like you haven't felt before, or not being able to wake up  Bluish-colored lips or face  If you have other health problems (like cancer, heart failure, an organ transplant, or severe kidney disease): Call your specialty clinic if you don't feel better in the next 2 days.  How can I protect others?  If you DO have symptoms:  Stay home and away from others (self-isolate). You can go back to being with other people when:  You've had no fever for 24 hours--without taking any medicine that reduces fever, AND  Your other symptoms (such as a cough) are better.  Wear a mask or face covering for 5 full days anytime you're around other people.  If you DON'T have symptoms:  Wear a mask or face covering for 5 full days anytime you're around other people.  If you plan to visit a clinic or hospital, please check their guidelines before you arrive--healthcare sites may have different rules. If you were tested because you're going to have surgery or another treatment, contact your care team for next steps.  During self-isolation  Stay home until it's safe to be around others.  At home, stay away from other people and pets. Or, wear a well-fitting mask when you need to be around others.  Monitor your symptoms. If you have any emergency warning signs listed at the link (such  "as trouble breathing, chest pain that won't go away, or confusion that's new to you), then get emergency medical care right away.  Stay in a separate room from other household members, if possible.  Use a separate bathroom, if possible.  Improve ventilation (air flow) at home, if possible.  Don't share personal household items, like cups, towels, and utensils.    What should I do if I have symptoms now and want to discuss treatments?  Call your family clinic. Or, dial [phone] ([phone]) and say \"COVID\" when prompted, OR  Go to [facility]/[email or web address] (click \"Message Your Care Team\"), OR  Request an appointment on [employer]  When can I go back to work?  You should NOT go back to work until you meet the guidelines on page 1. (See the \"How can I protect others?\" section.) You don't need to be re-tested for COVID before going back to work. Studies show that you won't spread the virus if it's been at least 10 days since your symptoms started (or 20 days if you have a weak immune system).  Employers, schools, and daycares: This document serves as formal notice of medical guidelines before your employee or student can return to work or school. They must meet the guidelines in the \"How can I protect others?\" section on page 1 before going back in person.         Yue Lacy RN           "

## 2024-11-04 NOTE — TELEPHONE ENCOUNTER
Called patient's spouse. Left voice message to return call at [phone].    When patient or spouse returns call, please assist with COVID protocol.     SUAD MillerN RN  [facility]

## 2025-05-25 ENCOUNTER — NURSE TRIAGE (OUTPATIENT)
Dept: NURSING | Facility: CLINIC | Age: 65
End: 2025-05-25
Payer: COMMERCIAL

## 2025-05-25 NOTE — TELEPHONE ENCOUNTER
Nurse Triage SBAR    Is this a 2nd Level Triage? NO    Situation: Pt and his wife calling to report patient has a cough and sore throat that started two days ago     Background: Pt reports having lung Nodules that the [facility] had been monitoring; found with a CT scan     Assessment:   Two days ago patient developed a Sore throat  Last night it got progressively worse  Feels like the cold is in his Chest area now  Coughing up green and yellow phlegm   Cough started yesterday  Covid test negative  Hx of Hiatal hernia  Pressure on his chest and neck when lying down so he is sitting up   Had some chest pain that resolved; no chest pain at this time   Temperature:  No working thermometer   Quit smoking 29 years ago   Itchy throat  Had a headache; went away     Drinking tea  Honey   Soup    Exposure: A person coughed in his face a week ago     Protocol Recommended Disposition:   Home Care    Recommendation: Home care advice given     Reason for Disposition   Cough   Cough with cold symptoms (e.g., runny nose, postnasal drip, throat clearing)    Additional Information   Negative: Bluish (or gray) lips or face now   Negative: SEVERE difficulty breathing (e.g., struggling for each breath, speaks in single words)   Negative: [1] Difficulty breathing AND [2] exposure to flames, smoke, or fumes   Negative: [1] Stridor AND [2] difficulty breathing   Negative: Sounds like a life-threatening emergency to the triager   Negative: [1] MODERATE difficulty breathing (e.g., speaks in phrases, SOB even at rest, pulse 100-120) AND [2] still present when not coughing   Negative: Dry cough (non-productive;  no sputum or minimal clear sputum)   Negative: [1] Previous asthma attacks AND [2] this feels like asthma attack   Negative: Chest pain  (Exception: MILD central chest pain, present only when coughing.)   Negative: Patient sounds very sick or weak to the triager   Negative: [1] MILD difficulty breathing (e.g., minimal/no SOB at rest,  SOB with walking, pulse <100) AND [2] still present when not coughing   Negative: [1] Coughed up blood AND [2] > 1 tablespoon (15 ml)   (Exception: Blood-tinged sputum.)   Negative: Wheezing is present   Negative: Coughing up gerardo-colored (reddish-brown) sputum   Negative: [1] Continuous (nonstop) coughing interferes with work or school AND [2] no improvement using cough treatment per Care Advice   Negative: SEVERE coughing spells (e.g., whooping sound after coughing, vomiting after coughing)   Negative: [1] Using nasal washes and pain medicine > 24 hours AND [2] sinus pain (around cheekbone or eye) persists   Negative: Earache   Negative: [1] Known COPD or other severe lung disease (i.e., bronchiectasis, cystic fibrosis, lung surgery) AND [2] worsening symptoms (i.e., increased sputum purulence or amount, increased breathing difficulty   Negative: [1] Coughed up blood-tinged sputum AND [2] more than once   Negative: Cough has been present for > 3 weeks   Negative: [1] Nasal discharge AND [2] present > 10 days   Negative: Exposure to TB (Tuberculosis)   Negative: [1] Fever > 100.0 F (37.8 C) AND [2] diabetes mellitus or weak immune system (e.g., HIV positive, cancer chemo, splenectomy, organ transplant, chronic steroids)   Negative: Fever > 103 F (39.4 C)   Negative: [1] Fever > 100.0 F (37.8 C) AND [2] bedridden (e.g., CVA, chronic illness, recovering from surgery)   Commented on: [1] Fever > 101 F (38.3 C) AND [2] age > 60 years     No working thermometer   Commented on: Fever present > 3 days (72 hours)     No working thermometer   Commented on: [1] Fever returns after gone for over 24 hours AND [2] symptoms worse or not improved     No working thermometer    Protocols used: Cough - Acute [id number]-MU  Lesly Lucero RN   Triage Nurse Advisor on 5/25/2025 at 10:26 AM

## (undated) DEVICE — GLOVE BIOGEL PI MICRO INDICATOR UNDERGLOVE SZ 7.5 48975

## (undated) DEVICE — SOL WATER IRRIG 1000ML BOTTLE 07139-09

## (undated) DEVICE — GLOVE BIOGEL PI ULTRATOUCH G SZ 7.5 42175

## (undated) DEVICE — PREP CHLORAPREP 26ML TINTED ORANGE  260815

## (undated) RX ORDER — FENTANYL CITRATE 50 UG/ML
INJECTION, SOLUTION INTRAMUSCULAR; INTRAVENOUS
Status: DISPENSED
Start: 2022-03-15

## (undated) RX ORDER — FENTANYL CITRATE 50 UG/ML
INJECTION, SOLUTION INTRAMUSCULAR; INTRAVENOUS
Status: DISPENSED
Start: 2022-05-26

## (undated) RX ORDER — ONDANSETRON 4 MG/1
TABLET, ORALLY DISINTEGRATING ORAL
Status: DISPENSED
Start: 2022-05-26

## (undated) RX ORDER — FENTANYL CITRATE 50 UG/ML
INJECTION, SOLUTION INTRAMUSCULAR; INTRAVENOUS
Status: DISPENSED
Start: 2024-01-30